# Patient Record
Sex: FEMALE | Race: AMERICAN INDIAN OR ALASKA NATIVE | NOT HISPANIC OR LATINO
[De-identification: names, ages, dates, MRNs, and addresses within clinical notes are randomized per-mention and may not be internally consistent; named-entity substitution may affect disease eponyms.]

---

## 2017-02-21 ENCOUNTER — APPOINTMENT (OUTPATIENT)
Dept: DERMATOLOGY | Facility: CLINIC | Age: 42
End: 2017-02-21

## 2017-02-21 VITALS
HEART RATE: 73 BPM | DIASTOLIC BLOOD PRESSURE: 77 MMHG | SYSTOLIC BLOOD PRESSURE: 111 MMHG | WEIGHT: 155 LBS | HEIGHT: 59 IN | BODY MASS INDEX: 31.25 KG/M2

## 2017-04-13 ENCOUNTER — CLINICAL ADVICE (OUTPATIENT)
Age: 42
End: 2017-04-13

## 2017-04-21 ENCOUNTER — APPOINTMENT (OUTPATIENT)
Dept: INTERNAL MEDICINE | Facility: CLINIC | Age: 42
End: 2017-04-21

## 2017-04-21 VITALS
BODY MASS INDEX: 30.84 KG/M2 | SYSTOLIC BLOOD PRESSURE: 113 MMHG | HEIGHT: 59 IN | WEIGHT: 153 LBS | DIASTOLIC BLOOD PRESSURE: 75 MMHG | HEART RATE: 85 BPM

## 2017-04-21 DIAGNOSIS — E55.9 VITAMIN D DEFICIENCY, UNSPECIFIED: ICD-10-CM

## 2017-04-21 DIAGNOSIS — Z83.3 FAMILY HISTORY OF DIABETES MELLITUS: ICD-10-CM

## 2017-04-21 DIAGNOSIS — Z83.49 FAMILY HISTORY OF OTHER ENDOCRINE, NUTRITIONAL AND METABOLIC DISEASES: ICD-10-CM

## 2017-04-21 DIAGNOSIS — Z82.49 FAMILY HISTORY OF ISCHEMIC HEART DISEASE AND OTHER DISEASES OF THE CIRCULATORY SYSTEM: ICD-10-CM

## 2017-04-21 RX ORDER — TRIAMCINOLONE ACETONIDE 5 MG/G
0.5 OINTMENT TOPICAL
Qty: 1 | Refills: 2 | Status: DISCONTINUED | COMMUNITY
Start: 2017-02-21 | End: 2017-04-21

## 2017-04-24 ENCOUNTER — APPOINTMENT (OUTPATIENT)
Dept: NUTRITION | Facility: CLINIC | Age: 42
End: 2017-04-24

## 2017-05-01 LAB
ESTIMATED AVERGAGE GLUCOSE (NORTH): 111 MG/DL
HBA1C MFR BLD: 5.5 %

## 2017-06-13 ENCOUNTER — OUTPATIENT (OUTPATIENT)
Dept: OUTPATIENT SERVICES | Facility: HOSPITAL | Age: 42
LOS: 1 days | Discharge: HOME | End: 2017-06-13

## 2017-06-16 ENCOUNTER — APPOINTMENT (OUTPATIENT)
Dept: INTERNAL MEDICINE | Facility: CLINIC | Age: 42
End: 2017-06-16

## 2017-06-28 DIAGNOSIS — M50.01 CERVICAL DISC DISORDER WITH MYELOPATHY, HIGH CERVICAL REGION: ICD-10-CM

## 2017-06-28 DIAGNOSIS — G60.9 HEREDITARY AND IDIOPATHIC NEUROPATHY, UNSPECIFIED: ICD-10-CM

## 2017-07-24 ENCOUNTER — APPOINTMENT (OUTPATIENT)
Age: 42
End: 2017-07-24

## 2017-08-25 ENCOUNTER — OUTPATIENT (OUTPATIENT)
Dept: OUTPATIENT SERVICES | Facility: HOSPITAL | Age: 42
LOS: 1 days | Discharge: HOME | End: 2017-08-25

## 2017-09-01 ENCOUNTER — OUTPATIENT (OUTPATIENT)
Dept: OUTPATIENT SERVICES | Facility: HOSPITAL | Age: 42
LOS: 1 days | Discharge: HOME | End: 2017-09-01

## 2018-03-18 ENCOUNTER — OUTPATIENT (OUTPATIENT)
Dept: OUTPATIENT SERVICES | Facility: HOSPITAL | Age: 43
LOS: 1 days | Discharge: HOME | End: 2018-03-18

## 2018-03-18 DIAGNOSIS — M54.9 DORSALGIA, UNSPECIFIED: ICD-10-CM

## 2018-03-18 DIAGNOSIS — M54.2 CERVICALGIA: ICD-10-CM

## 2018-05-23 ENCOUNTER — APPOINTMENT (OUTPATIENT)
Dept: INTERNAL MEDICINE | Facility: CLINIC | Age: 43
End: 2018-05-23

## 2018-05-23 ENCOUNTER — OUTPATIENT (OUTPATIENT)
Dept: OUTPATIENT SERVICES | Facility: HOSPITAL | Age: 43
LOS: 1 days | Discharge: HOME | End: 2018-05-23

## 2018-05-23 VITALS
WEIGHT: 158 LBS | BODY MASS INDEX: 31.85 KG/M2 | HEART RATE: 89 BPM | HEIGHT: 59 IN | DIASTOLIC BLOOD PRESSURE: 78 MMHG | SYSTOLIC BLOOD PRESSURE: 119 MMHG

## 2018-05-23 DIAGNOSIS — E66.9 OBESITY, UNSPECIFIED: ICD-10-CM

## 2018-05-23 DIAGNOSIS — E78.00 PURE HYPERCHOLESTEROLEMIA, UNSPECIFIED: ICD-10-CM

## 2018-05-23 DIAGNOSIS — N94.9 UNSPECIFIED CONDITION ASSOCIATED WITH FEMALE GENITAL ORGANS AND MENSTRUAL CYCLE: ICD-10-CM

## 2018-05-23 DIAGNOSIS — Z87.2 PERSONAL HISTORY OF DISEASES OF THE SKIN AND SUBCUTANEOUS TISSUE: ICD-10-CM

## 2018-05-23 RX ORDER — GABAPENTIN 300 MG/1
300 CAPSULE ORAL
Qty: 60 | Refills: 0 | Status: COMPLETED | COMMUNITY
Start: 2017-04-21 | End: 2018-05-23

## 2018-05-23 NOTE — REVIEW OF SYSTEMS
[Muscle Pain] : muscle pain [Back Pain] : back pain [Headache] : headache [Negative] : Genitourinary [Joint Swelling] : no joint swelling [FreeTextEntry9] : neck pain

## 2018-05-23 NOTE — ASSESSMENT
[FreeTextEntry1] : 43 yo F PMHx obesity, HLD, chronic neck pain and back pain here for f/u.\par \par 1.  chronic neck pain - 2/2 cervical radiculopathy\par - neurology f/u\par - pt rehab \par - gabapentin 100mg TID\par - c/w methocarbamol, meloxicam \par \par 2.  HLD\par - check lipid panel \par \par 3.  incidental adnexal cyst\par - pelvic sono ordered\par \par 4. obesity\par - counseled on diet\par - saw nutritionist last year \par - lipid panel ordered \par \par 5. hcm:\par check cbc, cmp.  mammogram done in 2017 and pap smear done 2016\par \par

## 2018-05-23 NOTE — HISTORY OF PRESENT ILLNESS
[FreeTextEntry1] : follow up  \par \par  [de-identified] : 43 yo F PMHx HLD, obesity, vit D deficiency here for follow-up for right sided headache, neck pain, with BL arm distal paresthesias.  \par \par Saw neurologist from [facility] - was given script for MRI.  \par \par Last clinic visit last year - was given gabapentin 300mg BID but could not tolerate it because it made her very sedated.

## 2018-05-23 NOTE — PHYSICAL EXAM
[No Acute Distress] : no acute distress [Normal Sclera/Conjunctiva] : normal sclera/conjunctiva [Normal Outer Ear/Nose] : the outer ears and nose were normal in appearance [No Respiratory Distress] : no respiratory distress  [Clear to Auscultation] : lungs were clear to auscultation bilaterally [No Accessory Muscle Use] : no accessory muscle use [Normal Rate] : normal rate  [Regular Rhythm] : with a regular rhythm [Normal S1, S2] : normal S1 and S2 [No Edema] : there was no peripheral edema [Soft] : abdomen soft [Non Tender] : non-tender [Non-distended] : non-distended [No CVA Tenderness] : no CVA  tenderness [No Spinal Tenderness] : no spinal tenderness [No Joint Swelling] : no joint swelling [Grossly Normal Strength/Tone] : grossly normal strength/tone [Coordination Grossly Intact] : coordination grossly intact [No Focal Deficits] : no focal deficits [Normal Insight/Judgement] : insight and judgment were intact [de-identified] : pain and tender on palpation in the C/S

## 2018-05-24 DIAGNOSIS — E78.00 PURE HYPERCHOLESTEROLEMIA, UNSPECIFIED: ICD-10-CM

## 2018-05-24 DIAGNOSIS — M54.12 RADICULOPATHY, CERVICAL REGION: ICD-10-CM

## 2018-11-23 ENCOUNTER — APPOINTMENT (OUTPATIENT)
Dept: INTERNAL MEDICINE | Facility: CLINIC | Age: 43
End: 2018-11-23

## 2020-12-08 ENCOUNTER — APPOINTMENT (OUTPATIENT)
Dept: SURGERY | Facility: CLINIC | Age: 45
End: 2020-12-08
Payer: COMMERCIAL

## 2020-12-08 VITALS — WEIGHT: 155 LBS | BODY MASS INDEX: 31.25 KG/M2 | HEIGHT: 59 IN

## 2020-12-08 PROCEDURE — 99072 ADDL SUPL MATRL&STAF TM PHE: CPT

## 2020-12-08 PROCEDURE — 99243 OFF/OP CNSLTJ NEW/EST LOW 30: CPT

## 2020-12-08 NOTE — PHYSICAL EXAM
[Normal Breath Sounds] : Normal breath sounds [No Rash or Lesion] : No rash or lesion [Alert] : alert [Calm] : calm [JVD] : no jugular venous distention  [de-identified] : overweight [de-identified] : normal [de-identified] : mildly protuberant abdomen\par  [de-identified] : . [de-identified] : right inguinal hernia

## 2020-12-08 NOTE — ASSESSMENT
[FreeTextEntry1] : Lee is a pleasant 45-year-old woman with a past medical history significant for well-controlled asthma and 2 full-term pregnancies resulting in 2  sections presenting to the office with several months of worsening pain and swelling in the right groin suspicious for hernia.\par \par Physical examination demonstrates a moderate to large tender bulge in the right groin which is reducible with minimal difficulty consistent with a large symptomatic right inguinal hernia warranting surgical repair. There is no evidence of incarceration or strangulation, and the patient denies any symptoms of obstruction.  Examination of the left groin is unremarkable. Her umbilical examination is also unremarkable. She is moderately overweight with her current BMI of 31.\par \par I explained the pros and cons of surgery, as well as all risks, benefits, indications and alternatives of the procedure and the patient understood and agreed. Lee was scheduled for the repair of her right inguinal hernia with mesh on Friday, January 15, 2021 under LOCAL with IV SEDATION at [facility] with presurgical testing waived.  She was encouraged to avoid heavy lifting and strenuous activity in the interim, of course.

## 2020-12-10 ENCOUNTER — APPOINTMENT (OUTPATIENT)
Dept: UROGYNECOLOGY | Facility: CLINIC | Age: 45
End: 2020-12-10

## 2021-01-12 ENCOUNTER — OUTPATIENT (OUTPATIENT)
Dept: OUTPATIENT SERVICES | Facility: HOSPITAL | Age: 46
LOS: 1 days | Discharge: HOME | End: 2021-01-12

## 2021-01-12 ENCOUNTER — LABORATORY RESULT (OUTPATIENT)
Age: 46
End: 2021-01-12

## 2021-01-12 DIAGNOSIS — Z11.59 ENCOUNTER FOR SCREENING FOR OTHER VIRAL DISEASES: ICD-10-CM

## 2021-01-15 ENCOUNTER — OUTPATIENT (OUTPATIENT)
Dept: OUTPATIENT SERVICES | Facility: HOSPITAL | Age: 46
LOS: 1 days | Discharge: HOME | End: 2021-01-15

## 2021-01-15 ENCOUNTER — APPOINTMENT (OUTPATIENT)
Dept: SURGERY | Facility: AMBULATORY SURGERY CENTER | Age: 46
End: 2021-01-15
Payer: COMMERCIAL

## 2021-01-15 VITALS
HEIGHT: 59 IN | WEIGHT: 154.98 LBS | DIASTOLIC BLOOD PRESSURE: 64 MMHG | TEMPERATURE: 99 F | OXYGEN SATURATION: 100 % | HEART RATE: 82 BPM | SYSTOLIC BLOOD PRESSURE: 117 MMHG | RESPIRATION RATE: 18 BRPM

## 2021-01-15 VITALS
HEART RATE: 80 BPM | RESPIRATION RATE: 20 BRPM | SYSTOLIC BLOOD PRESSURE: 121 MMHG | OXYGEN SATURATION: 100 % | DIASTOLIC BLOOD PRESSURE: 63 MMHG

## 2021-01-15 DIAGNOSIS — Z98.891 HISTORY OF UTERINE SCAR FROM PREVIOUS SURGERY: Chronic | ICD-10-CM

## 2021-01-15 DIAGNOSIS — Z98.890 OTHER SPECIFIED POSTPROCEDURAL STATES: Chronic | ICD-10-CM

## 2021-01-15 PROCEDURE — 49505 PRP I/HERN INIT REDUC >5 YR: CPT | Mod: RT

## 2021-01-15 RX ORDER — SODIUM CHLORIDE 9 MG/ML
1000 INJECTION, SOLUTION INTRAVENOUS
Refills: 0 | Status: DISCONTINUED | OUTPATIENT
Start: 2021-01-15 | End: 2021-01-29

## 2021-01-15 RX ORDER — ALBUTEROL 90 UG/1
0.5 AEROSOL, METERED ORAL
Qty: 0 | Refills: 0 | DISCHARGE

## 2021-01-15 RX ORDER — GABAPENTIN 400 MG/1
1 CAPSULE ORAL
Qty: 0 | Refills: 0 | DISCHARGE

## 2021-01-15 RX ORDER — MORPHINE SULFATE 50 MG/1
2 CAPSULE, EXTENDED RELEASE ORAL
Refills: 0 | Status: DISCONTINUED | OUTPATIENT
Start: 2021-01-15 | End: 2021-01-15

## 2021-01-15 RX ORDER — MELOXICAM 15 MG/1
1 TABLET ORAL
Qty: 0 | Refills: 0 | DISCHARGE

## 2021-01-15 RX ORDER — TRAMADOL HYDROCHLORIDE 50 MG/1
1 TABLET ORAL
Qty: 30 | Refills: 0
Start: 2021-01-15 | End: 2021-01-19

## 2021-01-15 RX ADMIN — SODIUM CHLORIDE 100 MILLILITER(S): 9 INJECTION, SOLUTION INTRAVENOUS at 12:29

## 2021-01-15 NOTE — CHART NOTE - NSCHARTNOTEFT_GEN_A_CORE
PACU ANESTHESIA ADMISSION NOTE      Procedure: Repair of right inguinal hernia with mesh      Post op diagnosis:  Inguinal hernia, right        ____  Intubated  TV:______       Rate: ______      FiO2: ______    __x__  Patent Airway    __x__  Full return of protective reflexes    __x__  Full recovery from anesthesia / back to baseline status    Vitals  HR: 76  BP: 112/65  RR: 15  O2 Sat: 100  Temp: 97.6    Mental Status:  __x__ Awake   _____ Alert   ___x__ Drowsy   _____ Sedated    Nausea/Vomiting:  __x__ NO  ______Yes,   See Post - Op Orders          Pain Scale (0-10):  _____    Treatment: ____ None    __x__ See Post - Op/PCA Orders    Post - Operative Fluids:   ____ Oral   __x__ See Post - Op Orders    Plan: Discharge when criteria met:   __x__Home       _____Floor     _____Critical Care   Other:_________________    Comments: Patient had smooth intraoperative event, no anesthesia complication.

## 2021-01-15 NOTE — ASU PREOP CHECKLIST - ALLERGY BAND ON
no known allergies Valtrex Pregnancy And Lactation Text: this medication is Pregnancy Category B and is considered safe during pregnancy. This medication is not directly found in breast milk but it's metabolite acyclovir is present.

## 2021-01-15 NOTE — ASU DISCHARGE PLAN (ADULT/PEDIATRIC) - CARE PROVIDER_API CALL
Alli Hurtado)  Surgery  [address] [facility], [address] [address]  Phone: [phone]  Fax: [phone]  Scheduled Appointment: 01/26/2021

## 2021-01-22 DIAGNOSIS — J45.909 UNSPECIFIED ASTHMA, UNCOMPLICATED: ICD-10-CM

## 2021-01-22 DIAGNOSIS — K40.90 UNILATERAL INGUINAL HERNIA, WITHOUT OBSTRUCTION OR GANGRENE, NOT SPECIFIED AS RECURRENT: ICD-10-CM

## 2021-01-22 DIAGNOSIS — M06.9 RHEUMATOID ARTHRITIS, UNSPECIFIED: ICD-10-CM

## 2021-01-26 ENCOUNTER — APPOINTMENT (OUTPATIENT)
Dept: SURGERY | Facility: CLINIC | Age: 46
End: 2021-01-26
Payer: COMMERCIAL

## 2021-01-26 DIAGNOSIS — K40.90 UNILATERAL INGUINAL HERNIA, W/OUT OBSTRUCTION OR GANGRENE, NOT SPECIFIED AS RECURRENT: ICD-10-CM

## 2021-01-26 PROCEDURE — 99024 POSTOP FOLLOW-UP VISIT: CPT

## 2021-01-26 NOTE — ASSESSMENT
[FreeTextEntry1] : Lee underwent the repair of her large direct right inguinal hernia with mesh on January 15, 2021 under local with IV sedation without any problems or complications. Her wound is clean, dry and intact. There is no evidence of erythema, seroma formation or infection. She is tolerating a diet and having normal bowel movements. She denies any significant postoperative pain or discomfort at this time.\par \par She was counseled and reassured. Lee was discharged from the office with no specific followup necessary, but she knows to avoid any heavy lifting or strenuous activity for the next several weeks. We also discussed the importance of calorie restriction and healthy eating with regard to weight loss, hernia recurrence and her overall health.

## 2021-01-26 NOTE — CONSULT LETTER
[FreeTextEntry1] : Dear Dr. Yvonne Foster, \par \par I had the pleasure of seeing your patient, TIMMY ZAMORA, in my office today. Please see my note below. \par \par Thank you very much for allowing me to participate in the care of this patient. If you have any questions, please do not hesitate to contact me. \par \par \par Respectfully,\par \par Alli Hurtado M.D., FACS\par  \par \par \par \par cc: Dr. Ishan Ingram

## 2021-04-29 ENCOUNTER — EMERGENCY (EMERGENCY)
Facility: HOSPITAL | Age: 46
LOS: 0 days | Discharge: HOME | End: 2021-04-29
Attending: STUDENT IN AN ORGANIZED HEALTH CARE EDUCATION/TRAINING PROGRAM | Admitting: STUDENT IN AN ORGANIZED HEALTH CARE EDUCATION/TRAINING PROGRAM
Payer: COMMERCIAL

## 2021-04-29 VITALS
DIASTOLIC BLOOD PRESSURE: 60 MMHG | OXYGEN SATURATION: 99 % | RESPIRATION RATE: 16 BRPM | SYSTOLIC BLOOD PRESSURE: 113 MMHG | HEART RATE: 90 BPM

## 2021-04-29 VITALS
DIASTOLIC BLOOD PRESSURE: 77 MMHG | OXYGEN SATURATION: 98 % | HEART RATE: 89 BPM | WEIGHT: 158.07 LBS | RESPIRATION RATE: 20 BRPM | TEMPERATURE: 98 F | SYSTOLIC BLOOD PRESSURE: 146 MMHG | HEIGHT: 59 IN

## 2021-04-29 DIAGNOSIS — M25.511 PAIN IN RIGHT SHOULDER: ICD-10-CM

## 2021-04-29 DIAGNOSIS — M06.9 RHEUMATOID ARTHRITIS, UNSPECIFIED: ICD-10-CM

## 2021-04-29 DIAGNOSIS — Z98.890 OTHER SPECIFIED POSTPROCEDURAL STATES: Chronic | ICD-10-CM

## 2021-04-29 DIAGNOSIS — R07.9 CHEST PAIN, UNSPECIFIED: ICD-10-CM

## 2021-04-29 DIAGNOSIS — M79.621 PAIN IN RIGHT UPPER ARM: ICD-10-CM

## 2021-04-29 DIAGNOSIS — Z98.891 HISTORY OF UTERINE SCAR FROM PREVIOUS SURGERY: Chronic | ICD-10-CM

## 2021-04-29 DIAGNOSIS — R07.89 OTHER CHEST PAIN: ICD-10-CM

## 2021-04-29 DIAGNOSIS — J45.909 UNSPECIFIED ASTHMA, UNCOMPLICATED: ICD-10-CM

## 2021-04-29 LAB
ALBUMIN SERPL ELPH-MCNC: 4.6 G/DL — SIGNIFICANT CHANGE UP (ref 3.5–5.2)
ALP SERPL-CCNC: 95 U/L — SIGNIFICANT CHANGE UP (ref 30–115)
ALT FLD-CCNC: 13 U/L — SIGNIFICANT CHANGE UP (ref 0–41)
ANION GAP SERPL CALC-SCNC: 15 MMOL/L — HIGH (ref 7–14)
APTT BLD: 44.9 SEC — HIGH (ref 27–39.2)
AST SERPL-CCNC: 15 U/L — SIGNIFICANT CHANGE UP (ref 0–41)
BASOPHILS # BLD AUTO: 0.06 K/UL — SIGNIFICANT CHANGE UP (ref 0–0.2)
BASOPHILS NFR BLD AUTO: 0.6 % — SIGNIFICANT CHANGE UP (ref 0–1)
BILIRUB SERPL-MCNC: <0.2 MG/DL — SIGNIFICANT CHANGE UP (ref 0.2–1.2)
BUN SERPL-MCNC: 9 MG/DL — LOW (ref 10–20)
CALCIUM SERPL-MCNC: 9.8 MG/DL — SIGNIFICANT CHANGE UP (ref 8.5–10.1)
CHLORIDE SERPL-SCNC: 102 MMOL/L — SIGNIFICANT CHANGE UP (ref 98–110)
CO2 SERPL-SCNC: 20 MMOL/L — SIGNIFICANT CHANGE UP (ref 17–32)
CREAT SERPL-MCNC: 0.7 MG/DL — SIGNIFICANT CHANGE UP (ref 0.7–1.5)
EOSINOPHIL # BLD AUTO: 0.61 K/UL — SIGNIFICANT CHANGE UP (ref 0–0.7)
EOSINOPHIL NFR BLD AUTO: 6 % — SIGNIFICANT CHANGE UP (ref 0–8)
GLUCOSE SERPL-MCNC: 107 MG/DL — HIGH (ref 70–99)
HCG SERPL QL: NEGATIVE — SIGNIFICANT CHANGE UP
HCT VFR BLD CALC: 38.1 % — SIGNIFICANT CHANGE UP (ref 37–47)
HGB BLD-MCNC: 12.4 G/DL — SIGNIFICANT CHANGE UP (ref 12–16)
IMM GRANULOCYTES NFR BLD AUTO: 0.4 % — HIGH (ref 0.1–0.3)
INR BLD: 1.03 RATIO — SIGNIFICANT CHANGE UP (ref 0.65–1.3)
LYMPHOCYTES # BLD AUTO: 3.84 K/UL — HIGH (ref 1.2–3.4)
LYMPHOCYTES # BLD AUTO: 37.5 % — SIGNIFICANT CHANGE UP (ref 20.5–51.1)
MCHC RBC-ENTMCNC: 26.7 PG — LOW (ref 27–31)
MCHC RBC-ENTMCNC: 32.5 G/DL — SIGNIFICANT CHANGE UP (ref 32–37)
MCV RBC AUTO: 82.1 FL — SIGNIFICANT CHANGE UP (ref 81–99)
MONOCYTES # BLD AUTO: 0.68 K/UL — HIGH (ref 0.1–0.6)
MONOCYTES NFR BLD AUTO: 6.6 % — SIGNIFICANT CHANGE UP (ref 1.7–9.3)
NEUTROPHILS # BLD AUTO: 5 K/UL — SIGNIFICANT CHANGE UP (ref 1.4–6.5)
NEUTROPHILS NFR BLD AUTO: 48.9 % — SIGNIFICANT CHANGE UP (ref 42.2–75.2)
NRBC # BLD: 0 /100 WBCS — SIGNIFICANT CHANGE UP (ref 0–0)
PLATELET # BLD AUTO: 344 K/UL — SIGNIFICANT CHANGE UP (ref 130–400)
POTASSIUM SERPL-MCNC: 4.2 MMOL/L — SIGNIFICANT CHANGE UP (ref 3.5–5)
POTASSIUM SERPL-SCNC: 4.2 MMOL/L — SIGNIFICANT CHANGE UP (ref 3.5–5)
PROT SERPL-MCNC: 7.7 G/DL — SIGNIFICANT CHANGE UP (ref 6–8)
PROTHROM AB SERPL-ACNC: 11.8 SEC — SIGNIFICANT CHANGE UP (ref 9.95–12.87)
RBC # BLD: 4.64 M/UL — SIGNIFICANT CHANGE UP (ref 4.2–5.4)
RBC # FLD: 12.8 % — SIGNIFICANT CHANGE UP (ref 11.5–14.5)
SODIUM SERPL-SCNC: 137 MMOL/L — SIGNIFICANT CHANGE UP (ref 135–146)
TROPONIN T SERPL-MCNC: <0.01 NG/ML — SIGNIFICANT CHANGE UP
WBC # BLD: 10.23 K/UL — SIGNIFICANT CHANGE UP (ref 4.8–10.8)
WBC # FLD AUTO: 10.23 K/UL — SIGNIFICANT CHANGE UP (ref 4.8–10.8)

## 2021-04-29 PROCEDURE — 71275 CT ANGIOGRAPHY CHEST: CPT | Mod: 26,MA

## 2021-04-29 PROCEDURE — 73030 X-RAY EXAM OF SHOULDER: CPT | Mod: 26,RT

## 2021-04-29 PROCEDURE — 70498 CT ANGIOGRAPHY NECK: CPT | Mod: 26,MA

## 2021-04-29 PROCEDURE — 71045 X-RAY EXAM CHEST 1 VIEW: CPT | Mod: 26

## 2021-04-29 PROCEDURE — 93010 ELECTROCARDIOGRAM REPORT: CPT

## 2021-04-29 PROCEDURE — 73110 X-RAY EXAM OF WRIST: CPT | Mod: 26,RT

## 2021-04-29 PROCEDURE — 73080 X-RAY EXAM OF ELBOW: CPT | Mod: 26,RT

## 2021-04-29 PROCEDURE — 70496 CT ANGIOGRAPHY HEAD: CPT | Mod: 26,MA

## 2021-04-29 PROCEDURE — 99285 EMERGENCY DEPT VISIT HI MDM: CPT

## 2021-04-29 RX ORDER — METHOCARBAMOL 500 MG/1
1000 TABLET, FILM COATED ORAL ONCE
Refills: 0 | Status: COMPLETED | OUTPATIENT
Start: 2021-04-29 | End: 2021-04-29

## 2021-04-29 RX ORDER — SODIUM CHLORIDE 9 MG/ML
1000 INJECTION, SOLUTION INTRAVENOUS ONCE
Refills: 0 | Status: COMPLETED | OUTPATIENT
Start: 2021-04-29 | End: 2021-04-29

## 2021-04-29 RX ADMIN — SODIUM CHLORIDE 1000 MILLILITER(S): 9 INJECTION, SOLUTION INTRAVENOUS at 16:27

## 2021-04-29 RX ADMIN — METHOCARBAMOL 1000 MILLIGRAM(S): 500 TABLET, FILM COATED ORAL at 19:37

## 2021-04-29 NOTE — ED PROVIDER NOTE - NS ED ROS FT
CONSTITUTIONAL: (-) fevers, (-) chills  EYES: (-) vision changes, (-) photophobia  ENT: (-) congestion, (-) rhinorrhea, (-) sore throat  NECK: (-) neck pain, (-) neck stiffness  CARDIO: (+) chest pain, (-) palpitations, (-) edema  PULM: (-) cough, (-) sputum, (-) chest tightness, (+) shortness of breath, (-) wheezing, (-) hemoptysis, (-) stridor  GI: (-) nausea, (-) vomiting, (-) diarrhea, (-) constipation, (-) abdominal pain, (-) melena, (-) hematochezia  : (-) dysuria, (-) hematuria, (-) frequency, (-) urgency, (-) flank pain  ENDO: (-) polyuria, (-) polydipsia, (-) polyphagia  HEME: (-) easy bruising, (-) easy bleeding  MSK: (-) back pain, (-) myalgias, (-) gait difficulty  SKIN: (-) rashes, (-) pallor  NEURO: see HPI, (+) headache, (-) dizziness, (-) lightheadedness, (-) weakness, (-) syncope    *all other systems negative except as documented above and in the HPI*

## 2021-04-29 NOTE — ED ADULT NURSE NOTE - OBJECTIVE STATEMENT
pt 44 y/o female presents to ED c/o right sided chest pain, up arm into shoulder . pt states she does sometimes have neck pain but not like this

## 2021-04-29 NOTE — ED PROVIDER NOTE - ATTENDING CONTRIBUTION TO CARE
45F with PMH asthma not on CCS never intubated, RA not on meds, who p/w pain to her R arm that radiates up her neck and associated with pain to her R temple. She reports b/l hand pain at baseline since she is on a computer for prolonged period of time for work. Pain described as "pulling", radiates to her scapula, not pulsatile or throbbing and denies focal weakness or numbness, trauma/falls, SOB, n/v/f/c, visual changes, phono/photophobia. She was previously on meloxicam for RA but has been off for years; her rheumatologist has since retired.     Gen - NAD, Head - NCAT, Pharynx - clear, MMM, Heart - RRR, no m/g/r, Lungs - CTAB, no w/c/r, Abdomen - soft, NT, ND, Skin - No rash, Extremities - + ttp R AC joint, R elbow, FROM, no edema, erythema, ecchymosis, brisk cap refill, Neuro - CN 2-12 intact, nl strength and sensation, nl gait.    a/p: 45F with PMH asthma not on CCS never intubated, RA not on meds, who p/w pain to her R arm that radiates up her neck and associated with pain to her R temple. She reports b/l hand pain at baseline since she is on a computer for prolonged period of time for work. Pain described as "pulling", radiates to her scapula, not pulsatile or throbbing and denies focal weakness or numbness, trauma/falls, SOB, n/v/f/c, visual changes, phono/photophobia, jaw claudication. She was previously on meloxicam for RA but has been off for years; her rheumatologist has since retired.     Gen - NAD, Head - NCAT, Pharynx - clear, MMM, Heart - RRR, no m/g/r, Lungs - CTAB, no w/c/r, Abdomen - soft, NT, ND, Skin - No rash, Extremities - + ttp R AC joint, R elbow, FROM, no edema, erythema, ecchymosis, brisk cap refill, Neuro - CN 2-12 intact, nl strength and sensation, nl gait.    a/p: suspect MSK related however will need to r/o dissection, and will obtain ekg, cxr, ct angio head/neck/chest, labs, and will reassess. 45F with PMH asthma not on CCS never intubated, RA not on meds, who p/w pain to her R arm that radiates up her neck and associated with pain to her R temple. She reports b/l hand pain at baseline since she is on a computer for prolonged period of time for work. Pain described as "pulling", radiates to her scapula, not pulsatile or throbbing and denies focal weakness or numbness, trauma/falls, SOB, n/v/f/c, visual changes, phono/photophobia, jaw claudication. She was previously on meloxicam for RA but has been off for years; her rheumatologist has since retired.     Gen - NAD, Head - NCAT, no ttp b/l temples, Pharynx - clear, MMM, Heart - RRR, no m/g/r, Lungs - CTAB, no w/c/r, Abdomen - soft, NT, ND, Skin - No rash, Extremities - + ttp R AC joint, R elbow, FROM, no edema, erythema, ecchymosis, brisk cap refill, Neuro - CN 2-12 intact, nl strength and sensation, nl gait.    a/p: suspect MSK related however will need to r/o dissection, and will obtain ekg, cxr, ct angio head/neck/chest, labs, and will reassess.

## 2021-04-29 NOTE — ED PROVIDER NOTE - PHYSICAL EXAMINATION
VITALS:  I have reviewed the initial vital signs.  GENERAL: Well-developed, well-nourished, in no acute distress. Nontoxic.  HEENT: Sclera clear. EOMI, PERRLA. Mucous membranes moist.  NECK: supple w FROM. No JVD. No paraspinal or midline ttp.   CARDIO: RRR, nl S1 and S2. No murmurs, rubs, or gallops. No peripheral edema. 2+ radial and pedal pulses bilaterally. No chest wall tenderness.  PULM: Normal effort. CTA b/l without wheezes, rales, or rhonchi.  MSK: Patient holding right arm against body, unable to move shoulder 2/2 pain. intact ROM to elbow and wrist.   GI: Abdomen soft and non-distended. Nontender.  SKIN: Warm, dry. No pallor. No rash.  NEURO: A&Ox3. Speech clear. CN II-XII intact. 5/5 strength and sensation to b/l upper and lower extremities.   PSYCH: Calm and cooperative.

## 2021-04-29 NOTE — ED ADULT NURSE NOTE - NSIMPLEMENTINTERV_GEN_ALL_ED
Implemented All Universal Safety Interventions:  [place] to call system. Call bell, personal items and telephone within reach. Instruct patient to call for assistance. Room bathroom lighting operational. Non-slip footwear when patient is off stretcher. Physically safe environment: no spills, clutter or unnecessary equipment. Stretcher in lowest position, wheels locked, appropriate side rails in place.

## 2021-04-29 NOTE — ED PROVIDER NOTE - CARE PROVIDER_API CALL
Kayleen Mejia)  Rheumatology  [address] [place] [address]  Phone: [phone]  Fax: [phone]  Follow Up Time:

## 2021-04-29 NOTE — ED PROVIDER NOTE - OBJECTIVE STATEMENT
45 year old female w hx of cervical radiculopathy presents to the ED for evaluation of right arm and chest pain x 2 hours. Pt states she was at home working on her computer when her pain began. She initially attributed it to her radiculopathy, tried Bengay which did not improve the pain. She then began to develop worsening pain, which radiates now to her head and neck, as well as to her back, along with numbness to her hand. Pain is not pleuritic, positional, or worse with exertion. Denies fevers/chills, URI sx, cough, wheezing, palpitations, syncope, diaphoresis, n/v, abd pain, leg pain/swelling, recent travel/surgeries/immobilizations/trauma. +fhx of CAD (mother  of MI in her 40s).

## 2021-04-29 NOTE — ED PROVIDER NOTE - CLINICAL SUMMARY MEDICAL DECISION MAKING FREE TEXT BOX
45F with PMH asthma not on CCS never intubated, RA not on meds, who p/w pain to her R arm that radiates up her neck and associated with pain to her R temple. She reports b/l hand pain at baseline since she is on a computer for prolonged period of time for work. Pain described as "pulling", radiates to her scapula, not pulsatile or throbbing and denies focal weakness or numbness, trauma/falls, SOB, n/v/f/c, visual changes, phono/photophobia, jaw claudication. ekg non-ischemic, cxr clear, ct angio head/neck/chest reviewed and wnl. HEART score 1. neuro exam nl. no evidence of trauma. Pt reports improvement of sx and amenable with d/c and f/u with rheumatology. I have fully discussed the medical management and delivery of care with the patient. I have discussed any available labs, imaging and treatment options with the patient. All Questions answered at the bedside and printed copies of all results provided and recommended to review with PCP. Patient confirms understanding and has been given detailed return precautions. Patient instructed to return to the ED should symptoms persist or worsen. Patient has demonstrated capacity and has verbalized understanding. Patient is well appearing upon discharge, ambulatory with a steady gait.

## 2021-05-14 ENCOUNTER — APPOINTMENT (OUTPATIENT)
Dept: RHEUMATOLOGY | Facility: CLINIC | Age: 46
End: 2021-05-14
Payer: COMMERCIAL

## 2021-05-14 ENCOUNTER — RESULT REVIEW (OUTPATIENT)
Age: 46
End: 2021-05-14

## 2021-05-14 ENCOUNTER — OUTPATIENT (OUTPATIENT)
Dept: OUTPATIENT SERVICES | Facility: HOSPITAL | Age: 46
LOS: 1 days | Discharge: HOME | End: 2021-05-14

## 2021-05-14 ENCOUNTER — OUTPATIENT (OUTPATIENT)
Dept: OUTPATIENT SERVICES | Facility: HOSPITAL | Age: 46
LOS: 1 days | Discharge: HOME | End: 2021-05-14
Payer: COMMERCIAL

## 2021-05-14 VITALS — WEIGHT: 152 LBS | HEIGHT: 59 IN | BODY MASS INDEX: 30.64 KG/M2 | TEMPERATURE: 97.2 F

## 2021-05-14 DIAGNOSIS — M79.641 PAIN IN RIGHT HAND: ICD-10-CM

## 2021-05-14 DIAGNOSIS — Z98.891 HISTORY OF UTERINE SCAR FROM PREVIOUS SURGERY: Chronic | ICD-10-CM

## 2021-05-14 DIAGNOSIS — Z98.890 OTHER SPECIFIED POSTPROCEDURAL STATES: Chronic | ICD-10-CM

## 2021-05-14 PROCEDURE — 73130 X-RAY EXAM OF HAND: CPT | Mod: 26,50

## 2021-05-14 PROCEDURE — 99205 OFFICE O/P NEW HI 60 MIN: CPT | Mod: GC

## 2021-05-14 PROCEDURE — 73030 X-RAY EXAM OF SHOULDER: CPT | Mod: 26,50

## 2021-05-14 RX ORDER — GABAPENTIN 100 MG/1
100 CAPSULE ORAL 3 TIMES DAILY
Qty: 90 | Refills: 1 | Status: DISCONTINUED | COMMUNITY
Start: 2018-05-23 | End: 2021-05-14

## 2021-05-14 RX ORDER — MELOXICAM 15 MG/1
15 TABLET ORAL
Qty: 30 | Refills: 3 | Status: DISCONTINUED | COMMUNITY
Start: 2018-05-23 | End: 2021-05-14

## 2021-05-14 NOTE — PHYSICAL EXAM
[No Acute Distress] : no acute distress [Well Nourished] : well nourished [No Respiratory Distress] : no respiratory distress  [No Accessory Muscle Use] : no accessory muscle use [Clear to Auscultation] : lungs were clear to auscultation bilaterally [Normal Rate] : normal rate  [Regular Rhythm] : with a regular rhythm [Normal S1, S2] : normal S1 and S2 [Soft] : abdomen soft [Non Tender] : non-tender [Non-distended] : non-distended [No Joint Swelling] : no joint swelling [No Focal Deficits] : no focal deficits [Normal Affect] : the affect was normal [Normal Insight/Judgement] : insight and judgment were intact

## 2021-05-14 NOTE — ASSESSMENT
[FreeTextEntry1] : Right Arm Pain with Radiation to Hand\par -R/O RA vs cervical Radiculopathy \par -Will send for MR cervical spine, B/L shoulder and hand Xrays \par -RF.CCP, Vitamin D and B12 levels \par -In-House Neurology Referral given \par \par RTC in 3months  Yes

## 2021-05-14 NOTE — ADDENDUM
[FreeTextEntry1] : Right arm pain\par Shoulder pain bilateral \par Radiculopathy Lumbar\par Neck pain\par \par Has had prior MRI showing lumbar radiculopathy. Will get cervical MRI. labs as above. Hands and shoulder Xrays .  f/u 3 mo

## 2021-05-14 NOTE — HISTORY OF PRESENT ILLNESS
[FreeTextEntry1] : New Pt care Establishment  [de-identified] : Patient is 46 y/o female with medical Hx of Chronic Neck pain with radiation to R Hand. She denied any Trauma, she had initial complaint of Neck pain with B/L arm paraesthesia in 2018, she was diagnosed with possible cervical radiculopathy, she tried gabapentin and meloxicam which helped her symptoms significantly. But in the last 3 weeks she started complaining of Right shoulder pain, 9/10, sharp, constant, radiating down to the right arm  and has become more constant for the past 3 weeks . she was given neurology f/up but they only have appointment available for September 2021. She has positive family hx of RA in both Mother and Grandmother

## 2021-07-21 ENCOUNTER — OUTPATIENT (OUTPATIENT)
Dept: OUTPATIENT SERVICES | Facility: HOSPITAL | Age: 46
LOS: 1 days | Discharge: HOME | End: 2021-07-21
Payer: COMMERCIAL

## 2021-07-21 ENCOUNTER — RESULT REVIEW (OUTPATIENT)
Age: 46
End: 2021-07-21

## 2021-07-21 DIAGNOSIS — Z98.890 OTHER SPECIFIED POSTPROCEDURAL STATES: Chronic | ICD-10-CM

## 2021-07-21 DIAGNOSIS — M79.641 PAIN IN RIGHT HAND: ICD-10-CM

## 2021-07-21 DIAGNOSIS — Z98.891 HISTORY OF UTERINE SCAR FROM PREVIOUS SURGERY: Chronic | ICD-10-CM

## 2021-07-21 PROCEDURE — 72141 MRI NECK SPINE W/O DYE: CPT | Mod: 26

## 2021-07-21 PROCEDURE — 70551 MRI BRAIN STEM W/O DYE: CPT | Mod: 26

## 2021-08-16 DIAGNOSIS — M79.641 PAIN IN RIGHT HAND: ICD-10-CM

## 2021-08-16 DIAGNOSIS — M54.2 CERVICALGIA: ICD-10-CM

## 2021-08-27 ENCOUNTER — APPOINTMENT (OUTPATIENT)
Dept: RHEUMATOLOGY | Facility: CLINIC | Age: 46
End: 2021-08-27

## 2021-09-03 ENCOUNTER — OUTPATIENT (OUTPATIENT)
Dept: OUTPATIENT SERVICES | Facility: HOSPITAL | Age: 46
LOS: 1 days | Discharge: HOME | End: 2021-09-03

## 2021-09-03 ENCOUNTER — APPOINTMENT (OUTPATIENT)
Dept: RHEUMATOLOGY | Facility: CLINIC | Age: 46
End: 2021-09-03
Payer: COMMERCIAL

## 2021-09-03 ENCOUNTER — NON-APPOINTMENT (OUTPATIENT)
Age: 46
End: 2021-09-03

## 2021-09-03 VITALS
TEMPERATURE: 97 F | DIASTOLIC BLOOD PRESSURE: 75 MMHG | WEIGHT: 154 LBS | HEART RATE: 79 BPM | BODY MASS INDEX: 31.04 KG/M2 | OXYGEN SATURATION: 98 % | HEIGHT: 59 IN | SYSTOLIC BLOOD PRESSURE: 110 MMHG

## 2021-09-03 DIAGNOSIS — Z98.890 OTHER SPECIFIED POSTPROCEDURAL STATES: Chronic | ICD-10-CM

## 2021-09-03 DIAGNOSIS — Z98.891 HISTORY OF UTERINE SCAR FROM PREVIOUS SURGERY: Chronic | ICD-10-CM

## 2021-09-03 PROCEDURE — 99214 OFFICE O/P EST MOD 30 MIN: CPT | Mod: GC

## 2021-10-06 ENCOUNTER — OUTPATIENT (OUTPATIENT)
Dept: OUTPATIENT SERVICES | Facility: HOSPITAL | Age: 46
LOS: 1 days | Discharge: HOME | End: 2021-10-06
Payer: COMMERCIAL

## 2021-10-06 DIAGNOSIS — R32 UNSPECIFIED URINARY INCONTINENCE: ICD-10-CM

## 2021-10-06 DIAGNOSIS — Z98.890 OTHER SPECIFIED POSTPROCEDURAL STATES: Chronic | ICD-10-CM

## 2021-10-06 DIAGNOSIS — Z98.891 HISTORY OF UTERINE SCAR FROM PREVIOUS SURGERY: Chronic | ICD-10-CM

## 2021-10-06 PROCEDURE — 72148 MRI LUMBAR SPINE W/O DYE: CPT | Mod: 26

## 2021-10-21 ENCOUNTER — APPOINTMENT (OUTPATIENT)
Dept: SURGERY | Facility: CLINIC | Age: 46
End: 2021-10-21
Payer: COMMERCIAL

## 2021-10-21 VITALS — WEIGHT: 155 LBS | BODY MASS INDEX: 31.25 KG/M2 | HEIGHT: 59 IN

## 2021-10-21 DIAGNOSIS — S76.219A STRAIN OF ADDUCTOR MUSCLE, FASCIA AND TENDON OF UNSPECIFIED THIGH, INITIAL ENCOUNTER: ICD-10-CM

## 2021-10-21 DIAGNOSIS — R10.31 RIGHT LOWER QUADRANT PAIN: ICD-10-CM

## 2021-10-21 PROCEDURE — 99214 OFFICE O/P EST MOD 30 MIN: CPT

## 2021-10-21 NOTE — PHYSICAL EXAM
[Normal Breath Sounds] : Normal breath sounds [No Rash or Lesion] : No rash or lesion [Alert] : alert [Calm] : calm [JVD] : no jugular venous distention  [de-identified] : overweight [de-identified] : normal [de-identified] : moderately protuberant abdomen\par  [de-identified] : no hernia recurrence

## 2021-10-21 NOTE — CONSULT LETTER
[FreeTextEntry1] : Dear Dr. Yvonne Foster, \par \par I had the pleasure of seeing your patient, TIMMY ZAMORA, in my office today. Please see my note below. \par \par Thank you very much for allowing me to participate in the care of this patient. If you have any questions, please do not hesitate to contact me. \par \par \par Respectfully,\par \par Alli Hurtado M.D., FACS\par  \par \par \par \par cc: Dr. Ishan Ingram. \par

## 2021-10-21 NOTE — ED PROVIDER NOTE - WR ORDER DATE AND TIME 2
Advance Care Planning     Advance Care Planning (ACP) Physician/NP/PA Conversation      Date of Conversation: 10/21/2021  Conducted with: Patient with Decision Making Capacity    Healthcare Decision Maker:     Primary Decision Maker: Patricia Lainez - Daughter - [phone]    Secondary Decision Maker: Soy Porras - Sister - [phone]  Click here to complete [place] including selection of the Healthcare Decision Maker Relationship (ie \"Primary\")        Today we documented Decision Maker(s) consistent with Legal Next of Kin hierarchy. Care Preferences:    Hospitalization: \"If your health worsens and it becomes clear that your chance of recovery is unlikely, what would be your preference regarding hospitalization? \"  The patient would prefer hospitalization. Ventilation: \"If you were unable to breathe on your own and your chance of recovery was unlikely, what would be your preference about the use of a ventilator (breathing machine) if it was available to you? \"   The patient would desire the use of a ventilator. Resuscitation: \"In the event your heart stopped as a result of an underlying serious health condition, would you want attempts to be made to restart your heart, or would you prefer a natural death? \"   Yes, attempt to resuscitate.     Additional topics discussed: treatment goals, ventilation preferences and resuscitation preferences    Conversation Outcomes / Follow-Up Plan:   ACP complete - no further action today  Pt will bring copies of forms  Reviewed DNR/DNI and patient elects Full Code (Attempt Resuscitation)     Length of Voluntary ACP Conversation in minutes:  <16 minutes (Non-Billable)    Femi Hernandez MD 29-Apr-2021 16:38

## 2021-10-21 NOTE — ASSESSMENT
[FreeTextEntry1] : Lee presents back to the office approximately 9 months after the repair of her large direct right inguinal hernia with mesh with intermittent pain and discomfort in the right groin which occasionally last anywhere from 30 seconds to 15 minutes. She also has been experiencing lower back pain radiating down to her right ankle. She had a recent lumbosacral MRI which demonstrated bulging discs at T12-L1, L1-L2, L4-L5 and L5-S1.\par \par Physical examination demonstrates a well-healed scar in the right groin with some mild tenderness to deep palpation (which is common) and no evidence of hernia recurrence or delayed wound complications. Examination of her left groin demonstrates a mild to moderate weakness but no obvious hernia. Umbilical examination is unremarkable. She remains significantly overweight with a current BMI of 31.\par \par She was counseled and reassured. Her symptoms may be related to chronic strain in the groin due to overexertion and her excess abdominal weight, but they also may be related to her upper lumbar disc disease as T12, L1 and L2 supply sensory innervation to the right groin. I recommended using alternating heat and ice therapy on her groin and heat therapy on her back and recommended she followup with neurology and seek physical therapy. She may also benefit from a back brace or abdominal binder. She was encouraged to return to me in the future if her symptoms persist or worsen, of course.

## 2021-11-09 ENCOUNTER — APPOINTMENT (OUTPATIENT)
Dept: NEUROLOGY | Facility: CLINIC | Age: 46
End: 2021-11-09

## 2022-07-17 NOTE — REVIEW OF SYSTEMS
Labs s/f hMPV positive on RVP panel, UTI on UA. Patient also desating to 86-87% on RA upon ambulation. Given CAP abx and Tylenol. Patient also now states that he did not fall, but rather slid down his bed, so CT head was cancelled. Plan to admit to medicine for further management. Ricardo Grace [Joint Pain] : joint pain [Fever] : no fever [Chills] : no chills [Fatigue] : no fatigue [Chest Pain] : no chest pain [Palpitations] : no palpitations [Lower Ext Edema] : no lower extremity edema [Shortness Of Breath] : no shortness of breath [Wheezing] : no wheezing [Cough] : no cough [Abdominal Pain] : no abdominal pain [Nausea] : no nausea [Vomiting] : no vomiting [Joint Stiffness] : no joint stiffness [Muscle Weakness] : no muscle weakness [Back Pain] : no back pain [Itching] : no itching [Skin Rash] : no skin rash [Headache] : no headache [Dizziness] : no dizziness [Fainting] : no fainting

## 2023-03-24 ENCOUNTER — APPOINTMENT (OUTPATIENT)
Dept: PAIN MANAGEMENT | Facility: CLINIC | Age: 48
End: 2023-03-24
Payer: OTHER MISCELLANEOUS

## 2023-03-24 ENCOUNTER — TRANSCRIPTION ENCOUNTER (OUTPATIENT)
Age: 48
End: 2023-03-24

## 2023-03-24 VITALS
BODY MASS INDEX: 31.65 KG/M2 | DIASTOLIC BLOOD PRESSURE: 86 MMHG | SYSTOLIC BLOOD PRESSURE: 130 MMHG | HEIGHT: 59 IN | WEIGHT: 157 LBS | HEART RATE: 76 BPM

## 2023-03-24 PROCEDURE — 99204 OFFICE O/P NEW MOD 45 MIN: CPT

## 2023-03-24 NOTE — ASSESSMENT
[FreeTextEntry1] : This is a 47 year old female with a chief complaint of bilateral neck pain with pain around right ear. She is also experiencing headaches. She has been attending PT sessions for 2 weeks with no relief. We will obtain a MRI of the cervical spine and EMG/NCV of the upper extremities for further review. In regards to her headaches, we will obtain a MRI of the brain and she will follow up with neurology. In the interim, she is advised to continue with PT sessions. She will follow up in 3 weeks to review imaging and for further evaluation. All this patients questions were answered and the conversation was understood well.\par \par I recommend seeing Neurology for further evaluation.\par \par Physical therapy of the cervical spine 2-3 times a week for 4-8 weeks stressing a home exercise program of walking, shoulder girdle strengthening,  swimming, elliptical , recumbent bike, Stepan chi and Yoga. Use things that heat like hot shower or icy heat before rehab, exercising and at the beginning of the day, and ice (ice in a bag never directly on the skin) after activity and at the end of the day.\par \par An Upper Extremity EMG/NCV was ordered to delineate radiculopathy vs neuropathies vs nerve damage.\par \par I, Yamila Don, attest that this documentation has been prepared under the direction and in the presence of Provider Vidhi Walker MD.\par \par \par Thank you for allowing me to assist in the management of this patient. \par \par \par Best Regards, \par \par \par Vidhi Walker M.D., [facility]\par \par \par Diplomate, American Board of Physical Medicine and Rehabilitation\par Diplomate, American Board of Pain Medicine \par Diplomate, American Board of Pain Management\par

## 2023-03-24 NOTE — PHYSICAL EXAM
[Normal Coordination] : normal coordination [Normal DTR UE/LE] : normal DTR UE/LE  [Normal Sensation] : normal sensation [Normal Mood and Affect] : normal mood and affect [Orientated] : orientated [Able to Communicate] : able to communicate [Well Developed] : well developed [Well Nourished] : well nourished [] : full ROM with mild stiffness

## 2023-03-24 NOTE — HISTORY OF PRESENT ILLNESS
[FreeTextEntry1] : This is a 47 year old female, presenting as a walk in, here to establish care for bilateral neck pain with pain around the ear. She is also experiencing headaches which are quite bothersome. This pain has been ongoing for several years. She experiences pain in the right ear. The neck pain radiates into the bilateral upper extremities, R>L.  There is numbness/tingling in the right arm. She has been attending PT sessions for the past 2 weeks which provides her no relief of her symptoms.  The pain is constant and makes it difficult for her to sleep. At this time, her case is not related to WC however she is attempting to make it a case.\par

## 2023-03-24 NOTE — DATA REVIEWED
[FreeTextEntry1] : CT of the brain dated 03/17/2023 finds evidence of intracranial hemorrhage.  6 mm  calcification is seen along the posterior left parietal parasagittal region, which may simply relate to a falx calcifaction, but small meningioma is also diagnostic considereation. Recommend MRI scan of the without and with contrast for further evaluation. \par \par MRI of the cervical spine dated 7/21/21 finds Mild multilevel degenerative changes and disc bulges not significantly changed since prior examination without significant spinal canal or neural foraminal narrowing.\par \par SOAPP: no data obtained today.\par \par UDS: No data obtained today.\par \par [place] REGISTRY: Checked.

## 2023-04-05 ENCOUNTER — APPOINTMENT (OUTPATIENT)
Dept: NEUROLOGY | Facility: CLINIC | Age: 48
End: 2023-04-05
Payer: OTHER MISCELLANEOUS

## 2023-04-05 VITALS
HEIGHT: 59 IN | BODY MASS INDEX: 31.65 KG/M2 | HEART RATE: 87 BPM | WEIGHT: 157 LBS | SYSTOLIC BLOOD PRESSURE: 152 MMHG | DIASTOLIC BLOOD PRESSURE: 87 MMHG

## 2023-04-05 DIAGNOSIS — T88.7XXA UNSPECIFIED ADVERSE EFFECT OF DRUG OR MEDICAMENT, INITIAL ENCOUNTER: ICD-10-CM

## 2023-04-05 PROCEDURE — 99204 OFFICE O/P NEW MOD 45 MIN: CPT

## 2023-04-05 RX ORDER — SUMATRIPTAN 100 MG/1
100 TABLET, FILM COATED ORAL
Qty: 9 | Refills: 5 | Status: ACTIVE | COMMUNITY
Start: 2023-04-05 | End: 1900-01-01

## 2023-04-05 NOTE — PHYSICAL EXAM

## 2023-04-05 NOTE — HISTORY OF PRESENT ILLNESS
[FreeTextEntry1] : It's a pleasure to see Ms. TIMMY ZAMORA In the office today. She is a 47 year -  old woman  who presents to the office today for the evaluation of back pain neck pain going down arms mostly on the right, associated with right sided headache, scalp tenderness and paresthesia that she attributed to working for [employer] in front of a computer wearing headphone.  there is no specific accident and no worker's comp case set up.  it was only her therapist that told her that it should be worker's comp.  her symptoms started on 3/14/23\par \par she had a cT of the head already that’s essential normal. she has MRI of brain/cervical spine, EMG/NCS of the upper extremities scheduled\par \par \par she has seen pain management already and has been sent for physical therapy.  she tried gabapentin which caused significant drowsiness and naproxen caused GI side effects.

## 2023-04-05 NOTE — ASSESSMENT
[FreeTextEntry1] : Bilateral Carpal Tunnel Syndrome / Cubital Tunnel Syndrome. \par \par - Trial Of Occupational Therapy. \par - if not better, will refer to hand surgery\par \par cervical radiculopathy/cervicogenic headache\par -MRI of the brain/cervical spine, EMG/NCS of the upper extremities as ordered\par - a trial of physical therapy\par -followup with pain management\par -a trial of sumatriptan for headaches\par \par - I Will Follow up with her in Two Months. \par \par \par I, Maxine Flores, Attest that this documentation has been prepared under the direction and in the presence of Provider Jaguar Roldan DO\par \par Thank You for letting me assist in the management of this patient. \par \par Jaguar Roldan DO\par Board Certified, Neurology\par

## 2023-04-07 ENCOUNTER — APPOINTMENT (OUTPATIENT)
Dept: PAIN MANAGEMENT | Facility: CLINIC | Age: 48
End: 2023-04-07
Payer: OTHER MISCELLANEOUS

## 2023-04-07 PROCEDURE — 95886 MUSC TEST DONE W/N TEST COMP: CPT

## 2023-04-07 PROCEDURE — 95911 NRV CNDJ TEST 9-10 STUDIES: CPT

## 2023-04-28 ENCOUNTER — APPOINTMENT (OUTPATIENT)
Dept: PAIN MANAGEMENT | Facility: CLINIC | Age: 48
End: 2023-04-28
Payer: OTHER MISCELLANEOUS

## 2023-04-28 VITALS — WEIGHT: 157 LBS | BODY MASS INDEX: 31.65 KG/M2 | HEIGHT: 59 IN

## 2023-04-28 PROCEDURE — 99213 OFFICE O/P EST LOW 20 MIN: CPT

## 2023-04-28 NOTE — DATA REVIEWED
[FreeTextEntry1] : EMG/NCV of the upper extremities dated 4/07/23 is normal. \par \par MRI of the cervical spine dated  04/07/2023 finds straightening of lordosis suggest spasm.  Multilevel cervical spondylosis as follows C3-4 disc bulge without central stenosis.  C4-5 central disc herniation with mild central canal stenosis.  C5-6 right paracentral disc herniation with mild central canal stenosis.  C6-7 disc bulge with mild central stenosis.\par \par CT of the brain dated 03/17/2023 finds evidence of intracranial hemorrhage.  6 mm  calcification is seen along the posterior left parietal parasagittal region, which may simply relate to a falx calcifaction, but small meningioma is also diagnostic considereation. Recommend MRI scan of the without and with contrast for further evaluation. \par \par MRI of the cervical spine dated 7/21/21 finds Mild multilevel degenerative changes and disc bulges not significantly changed since prior examination without significant spinal canal or neural foraminal narrowing.\par \par SOAPP:  low on 4/28/23\par Low risk: Patient has combination of a low risk SOAP and no risk factors. UDS would be repeated randomly every quarter.\par \par UDS: No data obtained today.\par \par [place]: Checked.

## 2023-04-28 NOTE — ASSESSMENT
[FreeTextEntry1] : This is a 47 year old female with a chief complaint of bilateral neck pain with pain around right ear. She is also experiencing headaches. She has been attending PT sessions through her PCP since 3/14/23. PT sessions have  improved tension but she continues to have tightness in the muscles.  She is also experiencing numbness in the bilateral hands.  She is  advised to continue with physical therapy and start occupational therapy and follow up in 6 weeks for follow-up for re-evaluation. All this patients questions were answered and the conversation was understood well.\par \par \par THA, Yamila Don, attest that this documentation has been prepared under the direction and in the presence of Provider Vidhi Walker MD.\par \par \par Thank you for allowing me to assist in the management of this patient. \par \par \par Best Regards, \par \par \par Vidhi Walker M.D., FAAPMR\par \par \par Diplomate, American Board of Physical Medicine and Rehabilitation\par Diplomate, American Board of Pain Medicine \par Diplomate, American Board of Pain Management\par

## 2023-04-28 NOTE — PHYSICAL EXAM
[Normal Coordination] : normal coordination [Normal DTR UE/LE] : normal DTR UE/LE  [Normal Sensation] : normal sensation [Orientated] : orientated [Normal Mood and Affect] : normal mood and affect [Able to Communicate] : able to communicate [Well Developed] : well developed [Well Nourished] : well nourished [] : no palpable masses

## 2023-04-28 NOTE — HISTORY OF PRESENT ILLNESS
[FreeTextEntry1] : ORIGINAL PRESENTATION:  This is a 47 year old female, presenting as a walk in, here to establish care for bilateral neck pain with pain around the ear. She works as a  and states that on 3/14/23 she expererienced pain in her neck and numbness in her hands. She is also experiencing headaches which are quite bothersome. This pain has been ongoing for several years. She experiences pain in the right ear. The neck pain radiates into the bilateral upper extremities, R>L.  There is numbness/tingling in the right arm. She has been attending PT sessions for the past 2 weeks which provides her no relief of her symptoms.  The pain is constant and makes it difficult for her to sleep. \par \par  The patient has had this pain for 2 months. The pain started after injury at work Patient describes pain as severe. During the last month the pain has been worse during the day no typical pattern. Pain described as burning, sharp, dull/aching, shooting. Pain is associated with numbness and pins and needles into the upper extremities exercise, relaxation. Pain is not changed with lying down, sitting, standing, walking, coughing/sneezing, bowel movements. Bowel movements have become less frequent.\par \par ACTIVITIES: Patient could walk 7 blocks before the pain starts. The patient often lays down because of pain. Patient uses no assistive walking device at this time. Patient has difficulty going to work, performing household chores, socializing with friends, exercising at this time.\par \par PRIOR PAIN TREATMENTS: Moderate relief with physical therapy.\par \par PRIOR PAIN MEDICATIONS:  Naproxen.\par \par PATIENT PRESENTS FOR FOLLOW UP: She is under care for bilateral neck pain with numbness into the bilateral upper extremities, right greater than left. Her case is currently under workers comp.  MRI of the cervical spine and the EMG of the  upper extremities was reviewed with the patient and documented below.   She has been trialing PT under her PCP and has been attending  sessions ever since 03/14/2023.  she states the "tension" in her neck has improved but the muscles are still tight. She was approved for occupational therapy under Dr. Roldan and has yet to start. She has not returned to work and she wants to trial occupational therapy first.

## 2023-06-07 ENCOUNTER — APPOINTMENT (OUTPATIENT)
Dept: NEUROLOGY | Facility: CLINIC | Age: 48
End: 2023-06-07
Payer: OTHER MISCELLANEOUS

## 2023-06-07 PROCEDURE — 99214 OFFICE O/P EST MOD 30 MIN: CPT

## 2023-06-08 NOTE — HISTORY OF PRESENT ILLNESS
[FreeTextEntry1] : Ms. TIMMY ZAMORA returns to the office for follow-up and her prior history and physical have been reviewed and she reports no change since last visit.  She was last seen for acute on chronic cervical/lumbar radiculopathy which she attributed to her occupation as a  for [employer] sitting all day in front of a computer wearing headphone taking 80calls a day for years.  She has had all the symptoms in the past but it acutely worsened on 3/14/23 that she could not long work due mostly to the neck pain, headache, hand numbness.  back has worsened but not as bad as the neck therefore, she did not complain to pain management nor neurology the first time she saw us.  According to her, WC has establish both neck and back as the body part in this case.  She has started physical therapy for her neck with little relief.  she still reports a baseline pain in the neck rated at 8-9/10, and 6/10 in the back.  She still get hand numbness intermittently.  She has trouble doing house chores because of her symptoms.\par \par She has had the MRI of the brain which was normal, cervical/lumbar  spine showe bulging/herniated discs at  c4-5, c5-6, c6-7  and L3-4. L4-5, and L5-S1 levels.   EMG/NCS of the upper extremities was also done and it  was normal.\par \par \par She had tried sumtripan without improvement, gabapentin 100mg 2 tabs at night given by her pmd did help with sleep but not pain.\par

## 2023-06-08 NOTE — ASSESSMENT
[FreeTextEntry1] : cervical/lumbar radiculopathy\par \par since she has done MRI of the brain/cervical/lumbar spine as well as EMG/NCS of the upper extremities already, the only left to be done to complete the workup would be EMG/NCS of the lower extremities.  no further neurological workup after that. she should mainly follow up with pain management for pain control, and when that fails return to neurology to discuss about the option of surgery or be referred to neurosurgery directly.\par \par bilaterally carpal tunnel syndome\par -even though EMG/NCS of the upper extremities was negative for carpal tunnel syndrome, but clinically she does have it.  wrists have not been establish body parts so I urge her to talk to  regarding this.\par \par \par hersymptoms are causally related to the work related accident she sustained, and she is considered to have temporary total disability and is not recommended to go back to work at this time.\par \par \par  \par \par \par I, Maxine Flores, Attest that this documentation has been prepared under the direction and in the presence of Provider Dhiraj Noriega \par Thank You for letting me assist in the management of this patient. \par \par Jaguar Roldan DO\shemar Board Certified, Neurology\par

## 2023-06-08 NOTE — PHYSICAL EXAM

## 2023-06-09 ENCOUNTER — APPOINTMENT (OUTPATIENT)
Dept: PAIN MANAGEMENT | Facility: CLINIC | Age: 48
End: 2023-06-09
Payer: OTHER MISCELLANEOUS

## 2023-06-09 VITALS — BODY MASS INDEX: 31.65 KG/M2 | HEIGHT: 59 IN | WEIGHT: 157 LBS

## 2023-06-09 PROCEDURE — 99214 OFFICE O/P EST MOD 30 MIN: CPT

## 2023-06-09 NOTE — ASSESSMENT
[FreeTextEntry1] : This is a 47 year old female with a chief complaint of bilateral neck pain with pain around right ear. She is also experiencing headaches. She has been attending PT sessions through her PCP since 3/14/23. PT sessions have  improved tension but she continues to have tightness in the muscles and numbness/pain in the right arm/hand. We will proceed with a ALEXIS x1 w/ mac and follow up in 4 weeks. In the interim, she will continue with PT and is hopeful that occupational therapy will be approved. All this patients questions were answered and the conversation was understood well.\par \par Patient had a MRI that shows a radicular component along with pain referred into the upper extremity. Patient has trialed rehab (Home  exercise, physical therapy or chiropractic care) and medications.  I will schedule a cervical epidural steroid  1-3 depending on effectiveness.\par \par ANESTHESIA PARAGRAPH: The patient has severe anxiety of procedures that necessitates monitored anesthesia care (MAC). The procedure performed will be close to major nerves, arteries, and spinal cord and/or joint structures. Due to the proximity of these structures, we need the patient to be still during the procedure. With the help of MAC, this will be safely achieved and decrease the risk of any complications.\par \par RISK AND BENEFIT PARAGRAPH: Risk, benefits, pros and cons of procedure were explained to the patient using models and diagrams and their questions were answered.\par \par \par I, Yamila Don, attest that this documentation has been prepared under the direction and in the presence of Provider Vidhi Walker MD.\par \par \par Thank you for allowing me to assist in the management of this patient. \par \par \par Best Regards, \par \par \par Vidhi Walker M.D., FAAPMR\par \par \par Diplomate, American Board of Physical Medicine and Rehabilitation\par Diplomate, American Board of Pain Medicine \par Diplomate, American Board of Pain Management\par

## 2023-06-09 NOTE — HISTORY OF PRESENT ILLNESS
[FreeTextEntry1] : ORIGINAL PRESENTATION:  This is a 47 year old female, presenting as a walk in, here to establish care for bilateral neck pain with pain around the ear. She works as a  and states that on 3/14/23 she experienced pain in her neck and numbness in her hands. She is also experiencing headaches which are quite bothersome. This pain has been ongoing for several years. She experiences pain in the right ear. The neck pain radiates into the bilateral upper extremities, R>L.  There is numbness/tingling in the right arm. She has been attending PT sessions for the past 2 weeks which provides her no relief of her symptoms.  The pain is constant and makes it difficult for her to sleep. \par \par  The patient has had this pain for 2 months. The pain started after injury at work Patient describes pain as severe. During the last month the pain has been worse during the day no typical pattern. Pain described as burning, sharp, dull/aching, shooting. Pain is associated with numbness and pins and needles into the upper extremities exercise, relaxation. Pain is not changed with lying down, sitting, standing, walking, coughing/sneezing, bowel movements. Bowel movements have become less frequent.\par \par ACTIVITIES: Patient could walk 7 blocks before the pain starts. The patient often lays down because of pain. Patient uses no assistive walking device at this time. Patient has difficulty going to work, performing household chores, socializing with friends, exercising at this time.\par \par PRIOR PAIN TREATMENTS: Moderate relief with physical therapy.\par \par PRIOR PAIN MEDICATIONS:  Naproxen.\par \par PATIENT PRESENTS FOR FOLLOW UP: She is under care for bilateral neck pain with numbness into the bilateral upper extremities, right greater than left. Her case is currently under workers comp.  MRI of the cervical spine and the EMG of the  upper extremities was reviewed with the patient and documented below.   She has been trialing PT under her PCP and has been attending  sessions ever since 03/14/2023.  she states the "tension" in her neck has improved but the muscles are still tight. She continues to experience numbness and pain in the right arm and hand and face. She is pending approval for occupational therapy under Dr. Roldan.  In the interim, the option to undergo cervical epidural steroid injection was discussed to address the radicular symptoms in the right arm/hand and she is agreeable to move forward. 
No

## 2023-06-13 ENCOUNTER — FORM ENCOUNTER (OUTPATIENT)
Age: 48
End: 2023-06-13

## 2023-07-04 ENCOUNTER — FORM ENCOUNTER (OUTPATIENT)
Age: 48
End: 2023-07-04

## 2023-07-14 ENCOUNTER — APPOINTMENT (OUTPATIENT)
Dept: PAIN MANAGEMENT | Facility: CLINIC | Age: 48
End: 2023-07-14

## 2023-07-25 ENCOUNTER — EMERGENCY (EMERGENCY)
Facility: HOSPITAL | Age: 48
LOS: 0 days | Discharge: HOME | End: 2023-07-25
Attending: EMERGENCY MEDICINE
Payer: COMMERCIAL

## 2023-07-25 VITALS
TEMPERATURE: 98 F | RESPIRATION RATE: 18 BRPM | DIASTOLIC BLOOD PRESSURE: 67 MMHG | SYSTOLIC BLOOD PRESSURE: 133 MMHG | HEIGHT: 59 IN | OXYGEN SATURATION: 99 % | WEIGHT: 148.81 LBS | HEART RATE: 82 BPM

## 2023-07-25 DIAGNOSIS — M54.2 CERVICALGIA: ICD-10-CM

## 2023-07-25 DIAGNOSIS — M62.838 OTHER MUSCLE SPASM: ICD-10-CM

## 2023-07-25 DIAGNOSIS — Z98.890 OTHER SPECIFIED POSTPROCEDURAL STATES: Chronic | ICD-10-CM

## 2023-07-25 DIAGNOSIS — R51.9 HEADACHE, UNSPECIFIED: ICD-10-CM

## 2023-07-25 DIAGNOSIS — F41.9 ANXIETY DISORDER, UNSPECIFIED: ICD-10-CM

## 2023-07-25 DIAGNOSIS — R20.0 ANESTHESIA OF SKIN: ICD-10-CM

## 2023-07-25 DIAGNOSIS — G43.909 MIGRAINE, UNSPECIFIED, NOT INTRACTABLE, WITHOUT STATUS MIGRAINOSUS: ICD-10-CM

## 2023-07-25 DIAGNOSIS — M48.02 SPINAL STENOSIS, CERVICAL REGION: ICD-10-CM

## 2023-07-25 DIAGNOSIS — Z98.891 HISTORY OF UTERINE SCAR FROM PREVIOUS SURGERY: Chronic | ICD-10-CM

## 2023-07-25 DIAGNOSIS — R20.2 PARESTHESIA OF SKIN: ICD-10-CM

## 2023-07-25 DIAGNOSIS — R53.1 WEAKNESS: ICD-10-CM

## 2023-07-25 PROCEDURE — 72125 CT NECK SPINE W/O DYE: CPT | Mod: 26,MA

## 2023-07-25 PROCEDURE — 72125 CT NECK SPINE W/O DYE: CPT | Mod: MA

## 2023-07-25 PROCEDURE — 99284 EMERGENCY DEPT VISIT MOD MDM: CPT | Mod: 25

## 2023-07-25 PROCEDURE — 70450 CT HEAD/BRAIN W/O DYE: CPT | Mod: 26,MA

## 2023-07-25 PROCEDURE — 96374 THER/PROPH/DIAG INJ IV PUSH: CPT

## 2023-07-25 PROCEDURE — 99284 EMERGENCY DEPT VISIT MOD MDM: CPT

## 2023-07-25 PROCEDURE — 70450 CT HEAD/BRAIN W/O DYE: CPT | Mod: MA

## 2023-07-25 RX ORDER — LIDOCAINE 4 G/100G
1 CREAM TOPICAL ONCE
Refills: 0 | Status: COMPLETED | OUTPATIENT
Start: 2023-07-25 | End: 2023-07-25

## 2023-07-25 RX ORDER — SODIUM CHLORIDE 9 MG/ML
1000 INJECTION INTRAMUSCULAR; INTRAVENOUS; SUBCUTANEOUS ONCE
Refills: 0 | Status: COMPLETED | OUTPATIENT
Start: 2023-07-25 | End: 2023-07-25

## 2023-07-25 RX ORDER — METOCLOPRAMIDE HCL 10 MG
10 TABLET ORAL ONCE
Refills: 0 | Status: COMPLETED | OUTPATIENT
Start: 2023-07-25 | End: 2023-07-25

## 2023-07-25 RX ORDER — METOCLOPRAMIDE HCL 10 MG
10 TABLET ORAL ONCE
Refills: 0 | Status: DISCONTINUED | OUTPATIENT
Start: 2023-07-25 | End: 2023-07-25

## 2023-07-25 RX ORDER — ACETAMINOPHEN 500 MG
650 TABLET ORAL ONCE
Refills: 0 | Status: COMPLETED | OUTPATIENT
Start: 2023-07-25 | End: 2023-07-25

## 2023-07-25 RX ADMIN — Medication 104 MILLIGRAM(S): at 11:41

## 2023-07-25 RX ADMIN — LIDOCAINE 1 PATCH: 4 CREAM TOPICAL at 11:53

## 2023-07-25 RX ADMIN — Medication 650 MILLIGRAM(S): at 11:41

## 2023-07-25 RX ADMIN — SODIUM CHLORIDE 1000 MILLILITER(S): 9 INJECTION INTRAMUSCULAR; INTRAVENOUS; SUBCUTANEOUS at 11:41

## 2023-07-25 NOTE — ED ADULT NURSE NOTE - NSFALLUNIVINTERV_ED_ALL_ED
Bed/Stretcher in lowest position, wheels locked, appropriate side rails in place/Call bell, personal items and telephone in reach/Instruct patient to call for assistance before getting out of bed/chair/stretcher/Non-slip footwear applied when patient is off stretcher/[place] to call system/Physically safe environment - no spills, clutter or unnecessary equipment/Purposeful proactive rounding/Room/bathroom lighting operational, light cord in reach

## 2023-07-25 NOTE — ED ADULT TRIAGE NOTE - SPO2 (%)
[FreeTextEntry1] : Labs 8/30/19\par \par Glucose 101\par HgA1C 6.1\par \par BUN 36\par Creatinine 1.23\par eGFR 45\par \par AST 16\par ALT 14\par \par Total cholesterol 139\par Triglycerides 253\par HDL 38\par LDL 50\par \par Uric acid 6.6 
99

## 2023-07-25 NOTE — ED PROVIDER NOTE - CLINICAL SUMMARY MEDICAL DECISION MAKING FREE TEXT BOX
Patient with history of migraines and neck pain presents for evaluation of worsening headache neck pain.  She also endorses bilateral hand tingling occasionally dropping things from her hands.  She is no focal weakness on exam and her exam is unremarkable.  Here CT imaging of the head and neck no acute pathology.  Patient found to have multiple disc osteophyte complexes and cervical spine advised outpatient neurosurgery f/u.

## 2023-07-25 NOTE — ED PROVIDER NOTE - PHYSICAL EXAMINATION
VITAL SIGNS: I have reviewed nursing notes and confirm.  CONSTITUTIONAL: well-appearing, non-toxic, NAD  SKIN: Warm dry, normal skin turgor  HEAD: NCAT  EYES: EOMI, PERRLA, no scleral icterus  ENT: Moist mucous membranes, normal pharynx with no erythema or exudates  NECK: Supple; Right cervical tenderness, muscle spasm present. Full ROM. No cervical LAD  CARD: RRR, no murmurs, rubs or gallops  RESP: clear to ausculation b/l.  No rales, rhonchi, or wheezing.  ABD: soft, + BS, non-tender, non-distended, no rebound or guarding. No CVA tenderness  EXT: Full ROM, no bony tenderness, no pedal edema, no calf tenderness  NEURO: normal motor. normal sensory. CN II-XII intact. Cerebellar testing normal. Normal gait.  PSYCH: Cooperative, appropriate.

## 2023-07-25 NOTE — ED PROVIDER NOTE - PATIENT PORTAL LINK FT
You can access the FollowMyHealth Patient Portal offered by [facility] by registering at the following website: [email or web address]. By joining [employer]’s FollowMyHealth portal, you will also be able to view your health information using other applications (apps) compatible with our system.

## 2023-07-25 NOTE — ED PROVIDER NOTE - NSFOLLOWUPINSTRUCTIONS_ED_ALL_ED_FT
Our Emergency Department Referral Coordinators will be reaching out to you in the next 24-48 hours from 9:00am to 5:00pm with a follow up appointment. Please expect a phone call from the hospital in that time frame. If you do not receive a call or if you have any questions or concerns, you can reach them at   [phone]    Acute Neck Pain    WHAT YOU NEED TO KNOW:    Acute neck pain starts suddenly, increases quickly, and goes away in a few days. The pain may come and go, or be worse with certain movements. The pain may be only in your neck, or it may move to your arms, back, or shoulders. You may also have pain that starts in another body area and moves to your neck. Vertebral Column         DISCHARGE INSTRUCTIONS:    Return to the emergency department if:     You have an injury that causes neck pain and shooting pain down your arms or legs.      Your neck pain suddenly becomes severe.      You have neck pain along with numbness, tingling, or weakness in your arms or legs.      You have a stiff neck, a headache, and a fever.    Contact your healthcare provider if:     You have new or worsening symptoms.      Your symptoms continue even after treatment.      You have questions or concerns about your condition or care.    Medicines:     NSAIDs, such as ibuprofen, help decrease swelling, pain, and fever. This medicine is available without a doctor's order. Ask your healthcare provider which medicine to take and how often to take it. Follow directions. NSAIDs can cause stomach bleeding or kidney problems if not taken correctly. If you take blood thinner medicine, always ask if NSAIDs are safe for you.      Acetaminophen helps decrease pain and fever. Ask your healthcare provider how much to take and how often to take it. Follow directions. Acetaminophen can cause liver damage if not taken correctly.      Steroid medicine may be used to reduce inflammation. This can help relieve pain caused by swelling.      Take your medicine as directed. Contact your healthcare provider if you think your medicine is not helping or if you have side effects. Tell him or her if you are allergic to any medicine. Keep a list of the medicines, vitamins, and herbs you take. Include the amounts, and when and why you take them. Bring the list or the pill bottles to follow-up visits. Carry your medicine list with you in case of an emergency.    Manage or prevent acute neck pain:     Rest your neck as directed. Do not make sudden movements, such as turning your head quickly. Your healthcare provider may recommend you wear a cervical collar for a short time. The collar will prevent you from moving your head. This will give your neck time to heal if an injury is causing your neck pain. Ask your healthcare provider when you can return to sports or other normal daily activities.      Apply heat as directed. Heat helps relieve pain and swelling. Use a heat wrap, or soak a small towel in warm water. Wring out the extra water. Apply the heat wrap or towel for 20 minutes every hour, or as directed.      Apply ice as directed. Ice helps relieve pain and swelling, and can help prevent tissue damage. Use an ice pack, or put ice in a bag. Cover the ice pack or back with a towel before you apply it to your neck. Apply the ice pack or ice for 15 minutes every hour, or as directed. Your healthcare provider can tell you how often to apply ice.      Do neck exercises as directed. Neck exercises help strengthen the muscles and increase range of motion. Your healthcare provider will tell you which exercises are right for you. He may give you instructions, or he may recommend that you work with a physical therapist. Your healthcare provider or therapist can make sure you are doing the exercises correctly.       Maintain good posture. Try to keep your head and shoulders lifted when you sit. If you work in front of a computer, make sure the monitor is at the right level. You should not need to look up down to see the screen. You should also not have to lean forward to be able to read what is on the screen. Make sure your keyboard, mouse, and other computer items are placed where you do not have to extend your shoulder to reach them. Get up often if you work in front of a computer or sit for long periods of time. Stretch or walk around to keep your neck muscles loose.    Follow up with your healthcare provider as directed: Your healthcare provider may refer you to a specialist if your pain does not get better with treatment. Write down your questions so you remember to ask them during your visits.       © Copyright [employer] 2019 All illustrations and images included in CareNotes are the copyrighted property of [employer] or [employer].

## 2023-07-25 NOTE — ED PROVIDER NOTE - OBJECTIVE STATEMENT
47-year-old female with past medical history of anxiety, chronic neck pain, history of migraines who presents for worse headache and neck pain since yesterday.  Patient states that she tried taking naproxen and muscle relaxer yesterday states it did not help.  Endorses bilateral hand tingling and weakness.  States the pain occasionally radiates down her back.  No fevers, chills, nausea, vomiting, vision changes, abdominal pain, chest pain, shortness of breath, palpitations.

## 2023-08-09 ENCOUNTER — APPOINTMENT (OUTPATIENT)
Dept: NEUROLOGY | Facility: CLINIC | Age: 48
End: 2023-08-09
Payer: OTHER MISCELLANEOUS

## 2023-08-09 VITALS — BODY MASS INDEX: 30.04 KG/M2 | HEIGHT: 59 IN | WEIGHT: 149 LBS

## 2023-08-09 PROCEDURE — 99214 OFFICE O/P EST MOD 30 MIN: CPT | Mod: ACP

## 2023-08-09 NOTE — REVIEW OF SYSTEMS
[Hand Weakness] :  hand weakness [Leg Weakness] : no leg weakness [Poor Coordination] : good coordination [Numbness] : numbness [Tingling] : tingling [Abnormal Sensation] : an abnormal sensation [Negative] : Heme/Lymph

## 2023-08-09 NOTE — PHYSICAL EXAM
[General Appearance - Alert] : alert [General Appearance - In No Acute Distress] : in no acute distress [Oriented To Time, Place, And Person] : oriented to person, place, and time [Impaired Insight] : insight and judgment were intact [Affect] : the affect was normal [Person] : oriented to person [Place] : oriented to place [Time] : oriented to time [Concentration Intact] : normal concentrating ability [Visual Intact] : visual attention was ~T not ~L decreased [Naming Objects] : no difficulty naming common objects [Repeating Phrases] : no difficulty repeating a phrase [Writing A Sentence] : no difficulty writing a sentence [Fluency] : fluency intact [Comprehension] : comprehension intact [Reading] : reading intact [Past History] : adequate knowledge of personal past history [Cranial Nerves Optic (II)] : visual acuity intact bilaterally,  visual fields full to confrontation, pupils equal round and reactive to light [Cranial Nerves Oculomotor (III)] : extraocular motion intact [Cranial Nerves Trigeminal (V)] : facial sensation intact symmetrically [Cranial Nerves Facial (VII)] : face symmetrical [Cranial Nerves Vestibulocochlear (VIII)] : hearing was intact bilaterally [Cranial Nerves Glossopharyngeal (IX)] : tongue and palate midline [Cranial Nerves Accessory (XI - Cranial And Spinal)] : head turning and shoulder shrug symmetric [Cranial Nerves Hypoglossal (XII)] : there was no tongue deviation with protrusion [Motor Tone] : muscle tone was normal in all four extremities [No Muscle Atrophy] : normal bulk in all four extremities [Motor Strength Upper Extremities Bilaterally] : there was weakness in both upper extremities [Sensation Tactile Decrease] : light touch was intact [Balance] : balance was intact [Past-pointing] : there was no past-pointing [Tremor] : no tremor present [2+] : Ankle jerk left 2+ [Plantar Reflex Right Only] : normal on the right [Plantar Reflex Left Only] : normal on the left [FreeTextEntry6] : Hand  4-/5 on right 4+/5 on left, 5/5 proximal bilaterally  [PERRL With Normal Accommodation] : pupils were equal in size, round, reactive to light, with normal accommodation [Extraocular Movements] : extraocular movements were intact [Hearing Threshold Finger Rub Not [place]] : hearing was normal [Neck Appearance] : the appearance of the neck was normal [FreeTextEntry1] : cervical and lumbar tenderness  [Abnormal Walk] : normal gait [Involuntary Movements] : no involuntary movements were seen

## 2023-08-09 NOTE — HISTORY OF PRESENT ILLNESS
[FreeTextEntry1] :  Ms. TIMMY ZAMORA is a 47 year old female under our care for acute on chronic cervical and lumbar radiculopathy which she attributed to her occupation as a  for [employer] sitting all day in front of a computer. She has had all the symptoms in the past but it acutely worsened on 3/14/23 According to her, WC has establish both neck and back as the body part in this case. She has participated in physical therapy for her neck with little relief and continues to report neck pain rated at 8-9/10, and 6/10 in the back. She reports hand numbness intermittently and migraine headaches triggered by her neck pain. She has trouble doing house chores because of her symptoms.  MRI of the brain was normal  MRI Cervical spine 4.7.23 : C3-4 disc bulge  c4-5 central disc herniation  c5-6 paracentral disc herniation c6-7 disc bulge  MRI L spine : L3-4 disc bulge L4-5, and L5-S1 disc herniation EMG  upper extremities 4.7.23 : normal  Today : Today I had the pleasure of seeing Ms. TIMMY ZAMORA   in our office for follow up.  The patients previous history and physical findings have been reviewed.    Patient remains under our care for chronic cervical and lumbar radiculopathy with multilevel disc herniations noted on MRI. Additionally, she is under the care of pain management for her neck and back and was contemplating intervention with steroid injection. She has been participating in physical therapy for her neck since March 14th and she has not experienced any improvement in her symptoms and was originally recommended occupational therapy for her upper extremities however it was denied by workers comp.  She was further evaluated by workers comp Dr. Benitez who also recommended occupational therapy x 8 weeks 3x per week for her right upper extremity for right ulnar neuropathy.  After reviewing her MRI of the cervical spine and lumbar spine which showed multilevel disc herniations and disc disease I recommend she be evaluated by a Neurosurgeon to discuss potential surgical interventions. On exam she maintains 5/5 power to b/l UE proximally with weak intrinsic function predominantly on the right. Lower extremities remain 5/5 proximally and distally with 2+ reflexes throughout, no muscle atrophy, saddle anesthesia or bowel / bladder incontinence or retention.

## 2023-08-09 NOTE — ASSESSMENT
[FreeTextEntry1] : 47 year old female  patient with chronic cervical and lumbar radiculopathy with multilevel disc herniations noted on MRI. Today we reviewed her WC paper work and notes from their physician Dr. Benitez. I agree with Dr. Murray assessment that Ms Darling should continue Occupational Therapy for her RUE as well as physical therapy for her neck. New OT script ordered in chart. She has follow up scheduled with Dr. Walker and has a consult scheduled with Neurosurgeon Dr. Sterling. She will continue care with the above providers and will follow up with Neurology PRN.   I have personally reviewed with the PA, this patients history and physical exam findings, as documented above. I have discussed the relevant areas of concern, having direct implications to the presenting problems and illnesses, and have personally examined all pertinent findings which impact on the prior neurological treatment.   Nirmala Moralez MS, PA-BRETT Roldan, DO

## 2023-08-24 ENCOUNTER — APPOINTMENT (OUTPATIENT)
Dept: NEUROSURGERY | Facility: CLINIC | Age: 48
End: 2023-08-24
Payer: COMMERCIAL

## 2023-08-24 VITALS — HEIGHT: 59 IN | BODY MASS INDEX: 30.04 KG/M2 | WEIGHT: 149 LBS

## 2023-08-24 PROCEDURE — 99203 OFFICE O/P NEW LOW 30 MIN: CPT

## 2023-08-25 NOTE — PHYSICAL EXAM
[TextEntry] : Physical exam:   Vital Signs - stable  General - well appearing in no acute distress  HEENT-normocephalic atraumatic  Skin-intact  CV-no edema, good distal pulses, radial and DP  Lungs-no wheezing or cyanosis  Musculoskeletal- significant decreased ROM; no tenderness to palpation; negative Spurlings; negative Lhermittes; negative straight leg raise; negative FABERs; negative Tinels  Neuro- awake, alert, and oriented; CN II-XII intact; 5/5 strength; sensation intact; reflexes normal; no Hoffmans or clonus, gait intact

## 2023-08-25 NOTE — HISTORY OF PRESENT ILLNESS
[de-identified] : CHIEF COMPLAINT: Ms. Darling, Presents today with Neck and Lower back pain. Pain radiates down both shoulders, arms and hands. She is experiencing Numbness, tingling and weakness in both hands. She explains feeling numbness in the head as well. She states she has pain in her right knee.  CONSERVATIVE MANAGEMENT TRIALED: She has went to PT which she did about 50 sessions, which gave a short term relief. She was going to try Pain management but was Denied due to insurance.  Mild disc bulges on MRI

## 2023-08-25 NOTE — PLAN
[FreeTextEntry1] : No indication for neurosurgical intervention.  Pain management for injections.  Return as needed.

## 2023-08-29 ENCOUNTER — APPOINTMENT (OUTPATIENT)
Dept: PAIN MANAGEMENT | Facility: CLINIC | Age: 48
End: 2023-08-29
Payer: OTHER MISCELLANEOUS

## 2023-08-29 VITALS
HEART RATE: 84 BPM | DIASTOLIC BLOOD PRESSURE: 75 MMHG | WEIGHT: 149 LBS | SYSTOLIC BLOOD PRESSURE: 114 MMHG | BODY MASS INDEX: 30.04 KG/M2 | HEIGHT: 59 IN

## 2023-08-29 PROCEDURE — 99214 OFFICE O/P EST MOD 30 MIN: CPT

## 2023-08-29 NOTE — HISTORY OF PRESENT ILLNESS
[FreeTextEntry1] : ORIGINAL PRESENTATION:  This is a 47 year old female, presenting as a walk in, here to establish care for bilateral neck pain with pain around the ear. She works as a  and states that on 3/14/23 she experienced pain in her neck and numbness in her hands. She is also experiencing headaches which are quite bothersome. This pain has been ongoing for several years. She experiences pain in the right ear. The neck pain radiates into the bilateral upper extremities, R>L.  There is numbness/tingling in the right arm. She has been attending PT sessions for the past 2 weeks which provides her no relief of her symptoms.  The pain is constant and makes it difficult for her to sleep.    The patient has had this pain for 2 months. The pain started after injury at work Patient describes pain as severe. During the last month the pain has been worse during the day no typical pattern. Pain described as burning, sharp, dull/aching, shooting. Pain is associated with numbness and pins and needles into the upper extremities exercise, relaxation. Pain is not changed with lying down, sitting, standing, walking, coughing/sneezing, bowel movements. Bowel movements have become less frequent.  ACTIVITIES: Patient could walk 7 blocks before the pain starts. The patient often lays down because of pain. Patient uses no assistive walking device at this time. Patient has difficulty going to work, performing household chores, socializing with friends, exercising at this time.  PRIOR PAIN TREATMENTS: Moderate relief with physical therapy.  PRIOR PAIN MEDICATIONS:  Naproxen.  PATIENT PRESENTS FOR FOLLOW UP: She is under care for bilateral neck. She has been trialing PT under her PCP and has been attending sessions ever since 03/14/2023.  She states the "tension" in her neck has improved but the muscles are still tight. She continues to experience numbness and pain in the right arm and hand and face. At her previous visit, we submit for a ALEXIS which was unfortunately denied by . She followed up with neurosurgery, Dr. Sterling, and was advised that surgery is not indicated and was advised to trial the injection therapy. She is requesting that we resubmit for the injection at this time,   Of note, occupational therapy sessions were recently approved and she will begin soon.

## 2023-08-29 NOTE — ASSESSMENT
[FreeTextEntry1] : This is a 47 year old female with a chief complaint of bilateral neck pain with pain around right ear. She is also experiencing headaches. She has been attending PT sessions through her PCP since 3/14/23. PT sessions have improved tension, but she continues to have tightness in the muscles and numbness/pain in the right arm/hand. We will resubmit for a ALEXIS x1 w/ mac and follow up in 4 weeks. In the interim, she will continue with PT and OT. All this patients questions were answered and the conversation was understood well.  Patient had a MRI that shows a radicular component along with pain referred into the upper extremity. Patient has trialed rehab (Home  exercise, physical therapy or chiropractic care) and medications.  I will schedule a cervical epidural steroid  1-3 depending on effectiveness.  ANESTHESIA PARAGRAPH: The patient has severe anxiety of procedures that necessitates monitored anesthesia care (MAC). The procedure performed will be close to major nerves, arteries, and spinal cord and/or joint structures. Due to the proximity of these structures, we need the patient to be still during the procedure. With the help of MAC, this will be safely achieved and decrease the risk of any complications.  RISK AND BENEFIT PARAGRAPH: Risk, benefits, pros and cons of procedure were explained to the patient using models and diagrams and their questions were answered.   I, Yamila Don, attest that this documentation has been prepared under the direction and in the presence of Provider Vidhi Walker MD.   Thank you for allowing me to assist in the management of this patient.    Best Regards,    Vidhi Walker M.D., FAAPMR   Diplomate, American Board of Physical Medicine and Rehabilitation Diplomate, American Board of Pain Medicine  Diplomate, American Board of Pain Management

## 2023-10-04 ENCOUNTER — APPOINTMENT (OUTPATIENT)
Dept: NEUROLOGY | Facility: CLINIC | Age: 48
End: 2023-10-04
Payer: OTHER MISCELLANEOUS

## 2023-10-04 VITALS
BODY MASS INDEX: 34.48 KG/M2 | SYSTOLIC BLOOD PRESSURE: 120 MMHG | DIASTOLIC BLOOD PRESSURE: 85 MMHG | HEART RATE: 78 BPM | HEIGHT: 55 IN | WEIGHT: 149 LBS

## 2023-10-04 PROCEDURE — 99215 OFFICE O/P EST HI 40 MIN: CPT | Mod: ACP

## 2023-10-19 ENCOUNTER — APPOINTMENT (OUTPATIENT)
Dept: NEUROSURGERY | Facility: CLINIC | Age: 48
End: 2023-10-19

## 2023-10-27 ENCOUNTER — APPOINTMENT (OUTPATIENT)
Dept: PAIN MANAGEMENT | Facility: CLINIC | Age: 48
End: 2023-10-27
Payer: OTHER MISCELLANEOUS

## 2023-10-27 VITALS
HEIGHT: 55 IN | SYSTOLIC BLOOD PRESSURE: 121 MMHG | DIASTOLIC BLOOD PRESSURE: 87 MMHG | WEIGHT: 149 LBS | BODY MASS INDEX: 34.48 KG/M2 | HEART RATE: 98 BPM

## 2023-10-27 PROCEDURE — 99214 OFFICE O/P EST MOD 30 MIN: CPT

## 2023-10-27 RX ORDER — IBUPROFEN 800 MG/1
800 TABLET, FILM COATED ORAL
Refills: 0 | Status: ACTIVE | COMMUNITY

## 2023-10-27 RX ORDER — GUAIFENESIN 1200 MG/1
500 TABLET, EXTENDED RELEASE ORAL
Refills: 0 | Status: ACTIVE | COMMUNITY

## 2023-11-02 ENCOUNTER — APPOINTMENT (OUTPATIENT)
Dept: PAIN MANAGEMENT | Facility: CLINIC | Age: 48
End: 2023-11-02
Payer: OTHER MISCELLANEOUS

## 2023-11-02 PROCEDURE — 93770 DETERMINATION VENOUS PRESS: CPT

## 2023-11-02 PROCEDURE — 94761 N-INVAS EAR/PLS OXIMETRY MLT: CPT

## 2023-11-02 PROCEDURE — 93040 RHYTHM ECG WITH REPORT: CPT | Mod: 79

## 2023-11-02 PROCEDURE — 00600 ANES PX CRV SPINE&CORD NOS: CPT | Mod: QZ,P2

## 2023-11-02 PROCEDURE — 62321 NJX INTERLAMINAR CRV/THRC: CPT

## 2023-12-01 ENCOUNTER — APPOINTMENT (OUTPATIENT)
Dept: PAIN MANAGEMENT | Facility: CLINIC | Age: 48
End: 2023-12-01
Payer: OTHER MISCELLANEOUS

## 2023-12-01 VITALS — BODY MASS INDEX: 34.48 KG/M2 | WEIGHT: 149 LBS | HEIGHT: 55 IN

## 2023-12-01 PROCEDURE — 99214 OFFICE O/P EST MOD 30 MIN: CPT | Mod: ACP

## 2023-12-13 NOTE — HISTORY OF PRESENT ILLNESS
[FreeTextEntry1] : ORIGINAL PRESENTATION:  This is a 47 year old female, presenting as a walk in, here to establish care for bilateral neck pain with pain around the ear. She works as a  and states that on 3/14/23 she experienced pain in her neck and numbness in her hands. She is also experiencing headaches which are quite bothersome. This pain has been ongoing for several years. She experiences pain in the right ear. The neck pain radiates into the bilateral upper extremities, R>L.  There is numbness/tingling in the right arm. She has been attending PT sessions for the past 2 weeks which provides her no relief of her symptoms.  The pain is constant and makes it difficult for her to sleep.    The patient has had this pain for 2 months. The pain started after injury at work Patient describes pain as severe. During the last month the pain has been worse during the day no typical pattern. Pain described as burning, sharp, dull/aching, shooting. Pain is associated with numbness and pins and needles into the upper extremities exercise, relaxation. Pain is not changed with lying down, sitting, standing, walking, coughing/sneezing, bowel movements. Bowel movements have become less frequent.  ACTIVITIES: Patient could walk 7 blocks before the pain starts. The patient often lays down because of pain. Patient uses no assistive walking device at this time. Patient has difficulty going to work, performing household chores, socializing with friends, exercising at this time.  PRIOR PAIN TREATMENTS: Moderate relief with physical therapy.  PRIOR PAIN MEDICATIONS:  Naproxen.  PATIENT PRESENTS FOR FOLLOW UP: She is under our care for bilateral neck pain. She complains of severe stiffness and spasms affecting the cervical spine. She continues to experience numbness and pain in the right arm and hand and face. She is most recently s/p a ALEXIS w/MAC which provided little to no relief. She continues to have pain mostly muscular in nature. We discussed trialing a cervical trigger point injection as an alternative and she is agreeable. She is currently utilizing gabapentin 300 mg in conjunction with cyclobenzaprine for relief.

## 2023-12-13 NOTE — ASSESSMENT
[FreeTextEntry1] : This is a 48-year-old female with a chief complaint of bilateral neck pain with pain around right ear. She is also experiencing headaches. She has been attending PT sessions through her PCP since 3/14/23. She is most recently s/p a ALEXIS w/MAC which provided little to no relief. She continues to deal with muscle stiffness and spams. Physical exam revealed multiple palpable trigger points, taught bands, and twitch reflex to light and deep palpation of the cervical spine. We will submit for cervical TPIs 1-3 to target this pain. We will provide her with an acupuncture referral. All this patient's questions were answered, and the conversation was understood well.  Patient is temporarily moderately disabled. She is unable to work at this time.  DAVID Goodson MD

## 2023-12-13 NOTE — PHYSICAL EXAM
[] : no palpable masses [FreeTextEntry8] : Severe degree of palpable trigger points. +Twitch reflex to light and deep palpation. Taught bands present

## 2023-12-20 ENCOUNTER — APPOINTMENT (OUTPATIENT)
Dept: PAIN MANAGEMENT | Facility: CLINIC | Age: 48
End: 2023-12-20
Payer: OTHER MISCELLANEOUS

## 2023-12-20 PROCEDURE — 20553 NJX 1/MLT TRIGGER POINTS 3/>: CPT

## 2023-12-20 PROCEDURE — 76942 ECHO GUIDE FOR BIOPSY: CPT

## 2023-12-20 NOTE — PROCEDURE
[FreeTextEntry3] :  Trigger Point Injection under Ultrasound Guidance    Date:  2023  Patient: Lee Darling  :  1975  Preoperative diagnosis:         Trigger points/ Myalgia                         Postoperative diagnosis:       Same                                                     Procedure:                   Cervical Trigger point injections with ultrasound guidance.                                      Trigger point injections, 3 or more muscles:                                      Ultrasound Guidance:                                         Physician:                  Vidhi Walker MD, FAAPMR                                                                                                  Indications for Ultrasound:   Accurate percutaneous needle placement requires image guidance to prevent neuro/vascular injury or intravascular injection, as well as to avoid lung injury.    Medical Necessity:                 Failure of conservative management     Indication: The patient presents with a distribution of pain consistent with trigger point pathology.  On palpation and with ultrasound examination there is focal tenderness and a palpable taut band of muscle with restriction of motion.  Noninvasive treatment modalities such as rest, heat/ice and stretching have been ineffective.  The pain has been present for greater than 3 months.    CONSENT: The possible complications including infection, bleeding, nerve damage, pneumothorax (collapsed lung), hospital admission or failure of the procedure; though unusual, are theoretically possible. The patient was educated about the of the procedure and alternative therapies. All questions were answered and the patient freely gave consent to proceed.     PROCEDURE NOTE:  After obtaining written consent, the patient was placed on the fluoroscopic table in the prone position. Areas of point tenderness in the muscles were identified and prepped with Hibiclens in a sterile fashion.  Using continuous ultrasound guidance for needle localization, multiple trigger points in three or more muscle groups were injected with a 25-gauge 1.5" needle using 5ML 0.50% BUPIVACAINE with 5ML of 2% LIDOCAINE local anesthetic. The direction of needle insertion was kept as parallel to the skin as possible for injections over the thoracic region to avoid directing the needle tip towards the lung. All the needles were removed intact.      Ultrasound findings: No calcifications     Disposition: I have examined the patient and there are no new physical findings since the original presentation.  Sensory and motor function were intact.     Comment: 1st trigger point today, depending on effectiveness and insurance would repeat in 1 week or follow up in office in 1 week. Call if any problems.       This document was electronically signed by:    Vidhi Walker MD, FAAPMR Diplomate, American Board of Physical Medicine and Rehabilitation Diplomate, American Board of Pain Medicine

## 2024-01-04 ENCOUNTER — APPOINTMENT (OUTPATIENT)
Dept: PAIN MANAGEMENT | Facility: CLINIC | Age: 49
End: 2024-01-04
Payer: OTHER MISCELLANEOUS

## 2024-01-04 DIAGNOSIS — M54.41 LUMBAGO WITH SCIATICA, RIGHT SIDE: ICD-10-CM

## 2024-01-04 DIAGNOSIS — G89.29 LUMBAGO WITH SCIATICA, RIGHT SIDE: ICD-10-CM

## 2024-01-04 PROCEDURE — 99214 OFFICE O/P EST MOD 30 MIN: CPT | Mod: ACP

## 2024-01-04 NOTE — PHYSICAL EXAM
[Normal Coordination] : normal coordination [Normal DTR UE/LE] : normal DTR UE/LE  [Normal Sensation] : normal sensation [Normal Mood and Affect] : normal mood and affect [Orientated] : orientated [Able to Communicate] : able to communicate [Well Developed] : well developed [Well Nourished] : well nourished [FreeTextEntry8] : Severe degree of palpable trigger points. +Twitch reflex to light and deep palpation. Taught bands present  [] : positive sitting straight leg raise

## 2024-01-04 NOTE — HISTORY OF PRESENT ILLNESS
[FreeTextEntry1] : ORIGINAL PRESENTATION:  This is a 47 year old female, presenting as a walk in, here to establish care for bilateral neck pain with pain around the ear. She works as a  and states that on 3/14/23 she experienced pain in her neck and numbness in her hands. She is also experiencing headaches which are quite bothersome. This pain has been ongoing for several years. She experiences pain in the right ear. The neck pain radiates into the bilateral upper extremities, R>L.  There is numbness/tingling in the right arm. She has been attending PT sessions for the past 2 weeks which provides her no relief of her symptoms.  The pain is constant and makes it difficult for her to sleep.    The patient has had this pain for 2 months. The pain started after injury at work Patient describes pain as severe. During the last month the pain has been worse during the day no typical pattern. Pain described as burning, sharp, dull/aching, shooting. Pain is associated with numbness and pins and needles into the upper extremities exercise, relaxation. Pain is not changed with lying down, sitting, standing, walking, coughing/sneezing, bowel movements. Bowel movements have become less frequent.  ACTIVITIES: Patient could walk 7 blocks before the pain starts. The patient often lays down because of pain. Patient uses no assistive walking device at this time. Patient has difficulty going to work, performing household chores, socializing with friends, exercising at this time.  PRIOR PAIN TREATMENTS: Moderate relief with physical therapy.  PRIOR PAIN MEDICATIONS:  Naproxen.  PATIENT PRESENTS FOR FOLLOW UP: She is under our care for bilateral neck pain. She also has chronic lumbar pain with radiculopathy. She is s/p a Cervical TPI on 12/20/23 with at least 70% sustained relief. She noticed that her stiffness and spasms has not been as intense. She wishes to repeat cervical TPIs. A ALEXIS w/MAC which provided little to no relief. She also has lumbar pain with radiation to the bilateral lower extremities R>L. Pain is associated with numbness and tingling. Pain is constant in nature. We discussed trialing a right L4-S1 SNRI w/MAC and she is agreeable. She is currently utilizing gabapentin 300 mg in conjunction with cyclobenzaprine for relief.

## 2024-01-04 NOTE — ASSESSMENT
[FreeTextEntry1] : This is a 48-year-old female with a chief complaint of bilateral neck pain with pain around right ear. She is s/p a Cervical TPI on 12/20/23 with at least 70% sustained relief. Overall, her myofascial pain has improved. We will submit for a second set of cervical TPIs 1-3 to target this pain. She will continue with acupuncture which has been helping. As for her lumbar pain, symptoms and MRI of her lumbar spine point to a radicular component. We will submit for a right L4-L5, L5-S1 SNRI w/MAC for further relief. Patient will f/u in 4 weeks for further evaluation.  Patient had a MRI that shows a radicular component along with pain referred into the lower extremity. Patient has trialed rehab (Home exercise, physical therapy or chiropractic care) and medications I will schedule a right L4-L5, L5-S1 SNRI w/MAC 1-3 depending on effectiveness.  The patient has severe anxiety of procedures that necessitates monitored anesthesia care (MAC). The procedure performed will be close to major nerves, arteries, and spinal cord and/or joint structures. Due to the proximity of these structures, we need the patient to be still during the procedure.  With the help of MAC, this will be safely achieved and decrease the risk of any complications..mac  Total clinician time spent today on the patient is 30 minutes including preparing to see the patient, obtaining and/or reviewing and confirming history, performing medically necessary and appropriate examination, counseling and educating the patient and/or family, documenting clinical information in the EHR and communicating and/or referring to other healthcare professionals.  Patient is temporarily moderately disabled. She is unable to work at this time.  DAVID Goodson MD

## 2024-01-23 ENCOUNTER — APPOINTMENT (OUTPATIENT)
Dept: PAIN MANAGEMENT | Facility: CLINIC | Age: 49
End: 2024-01-23
Payer: OTHER MISCELLANEOUS

## 2024-01-23 DIAGNOSIS — M54.16 RADICULOPATHY, LUMBAR REGION: ICD-10-CM

## 2024-01-23 PROCEDURE — 93770 DETERMINATION VENOUS PRESS: CPT

## 2024-01-23 PROCEDURE — 64483 NJX AA&/STRD TFRM EPI L/S 1: CPT | Mod: RT

## 2024-01-23 PROCEDURE — 93040 RHYTHM ECG WITH REPORT: CPT | Mod: 79

## 2024-01-23 PROCEDURE — 94761 N-INVAS EAR/PLS OXIMETRY MLT: CPT

## 2024-01-23 PROCEDURE — 00630 ANES PX LUMBAR REGION NOS: CPT | Mod: QZ,P2

## 2024-01-23 PROCEDURE — 64484 NJX AA&/STRD TFRM EPI L/S EA: CPT | Mod: RT

## 2024-01-23 NOTE — PROCEDURE
[FreeTextEntry3] : SELECTIVE TRANSFORAMINAL LUMBAR EPIDURAL NERVE ROOT INJECTION     Date:  2024  Patient: Lee Darling  :  1975  Preoperative Diagnosis: Right L5 radiculitis; Right L4 radiculitis  Procedure: Selective Right L4-5; L5-S1 Transforaminal Lumbar Epidural Nerve Root Injection under Fluoroscopy  Phys[facility]: [facility]     Anesthesiologist/CRNA: Ms. Patrick  Anesthesia: See nurses notes/MAC/Cold spray   Medical Necessity:  Failure of conservative management.  Indication for Fluoroscopy:  This procedure requires the precise placement of the spinal needle into the specific paravertebral foramen.  It is the only way to accurately and safely perform the injection.     CONSENT: The possible complications including infection, bleeding, nerve damage, Hospital admission, death or failure of the procedure; though unusual, are theoretically possible. The patient was educated about the of the procedure and alternative therapies. All questions were answered and the patient freely gave consent to proceed.     Monitoring:  Patient had continuous blood pressure, EKG, and pulse oximetry throughout the case. See nurse's notes     PROCEDURE NOTE:  After obtaining written consent, the patient was placed on the fluoroscopic table in the prone position with the pillow placed under the hips. The lumbar area was prepped with betadine solution and draped in the usual manner. A time out was performed.  Cold spray was used to localize the area. The fluoroscope was used to identify the L3///L4///L5 vertebral body on the AP projection. It was then rotated into an oblique projection until the superior articulating process of the L5 (inferior) vertebra is projected beneath the 6 o'clock position of the L4 (superior) vertebrae. The 22 gauge 3.5inch needle was inserted in the skin at a point overlying the superior articulating process of the inferior vertebra and aimed for the 6 o'clock position of the superior vertebrae's pedicle.  After the needle contacted bone, a lateral projection was obtained to insure that the needle tip was in proximity with the vertebral body. Paresthesia's were not noted.  One ml of Omnipaque 240 was injected and a neurogram was obtained. Following demonstration of the neurogram, 1 ml of Preservative free normal saline and 1 ml of depomedrol (40mg) was injected. The small volume and relatively high concentration was chosen to preserve selectivity and diagnostic value of the injection. There was no CSF or heme identified.  The L5-S1 level was injected in identical fashion. There were no signs of, intravascular block or hypotension. The needle was removed intact. A band aid was place on the site.     Epidurogram: The nerve root was observed in its outline on the neurogram. Distal and proximal spread was noted pointing away from epidural fibrosis/scarring.      Complications: None. The patient tolerated the procedure well.      Disposition: I have examined the patient and there are no new physical findings since the original presentation.  Sensory and motor function were intact. The patient met discharge criteria see nurses notes. The discharge instruction sheet was reviewed and given to the patient. The patient was discharged home with a . If patient gets sustained relief will have patient do modified planks 3 times a day on carpet or yoga mat starting at 5 seconds building up to 1 minute without grimacing/Valsalva and walking.      Comment: 1st SNRI today, depending on effectiveness would schedule 2nd SNRI in 1-2 weeks vs caudal epidural steroid vs follow up in office depending on insurances. Follow up office. Call if any problems     This document was electronically signed by:  Vidhi Walker MD, FAAPMR Diplomate, American Board of Physical Medicine and Rehabilitation Diplomate, American Board of Pain Medicine

## 2024-02-02 NOTE — BRIEF OPERATIVE NOTE - COMMENTS
large perfix plug and patch

## 2024-02-14 ENCOUNTER — APPOINTMENT (OUTPATIENT)
Dept: PAIN MANAGEMENT | Facility: CLINIC | Age: 49
End: 2024-02-14
Payer: OTHER MISCELLANEOUS

## 2024-02-14 PROCEDURE — 99214 OFFICE O/P EST MOD 30 MIN: CPT

## 2024-02-14 NOTE — PHYSICAL EXAM
[Normal Coordination] : normal coordination [Normal DTR UE/LE] : normal DTR UE/LE  [Normal Sensation] : normal sensation [Normal Mood and Affect] : normal mood and affect [Oriented] : oriented [Able to Communicate] : able to communicate [Well Developed] : well developed [Well Nourished] : well nourished [] : no palpable masses [FreeTextEntry8] : Severe degree of palpable trigger points. +Twitch reflex to light and deep palpation. Taught bands present

## 2024-02-14 NOTE — ASSESSMENT
[FreeTextEntry1] : This is a 48-year-old female with a chief complaint of bilateral neck pain with pain around right ear. She previously underwent a Cervical TPI on 12/20/23 with at least 70% sustained relief. She wishes to repeat this injection and will be scheduled accordingly. She is s/p a RT L4-S1 TFESI x1 w/ mac on 1/23/24 which provided her with no relief of her symptoms. We will proceed with an alternative approach in the form of a caudal BOBYB with the hopes it provides her better relief of her symptoms. Patient will follow up afterwards for reassessment. All this patients questions were answered and the conversation was understood well.  Patient had a MRI that shows a radicular component along with pain referred into the lower extremity. Patient has trialed rehab (Home exercise, physical therapy or chiropractic care) and medications I will schedule a Caudal 1-3 depending on effectiveness.  The patient has severe anxiety of procedures that necessitates monitored anesthesia care (MAC). The procedure performed will be close to major nerves, arteries, and spinal cord and/or joint structures. Due to the proximity of these structures, we need the patient to be still during the procedure. With the help of MAC, this will be safely achieved and decrease the risk of any complications.  RISK AND BENEFIT PARAGRAPH: Risk, benefits, pros and cons of procedure were explained to the patient using models and diagrams and their questions were answered.  I, Yamila Don, attest that this documentation has been prepared under the direction and in the presence of Provider Vidhi Walker MD.   Thank you for allowing me to assist in the management of this patient.    Best Regards,    Vidhi Walker M.D., FAAPMR   Diplomate, American Board of Physical Medicine and Rehabilitation Diplomate, American Board of Pain Medicine  Diplomate, American Board of Pain Management

## 2024-02-14 NOTE — HISTORY OF PRESENT ILLNESS
[FreeTextEntry1] : ORIGINAL PRESENTATION:  This is a 48 year old female, presenting as a walk in, here to establish care for bilateral neck pain with pain around the ear. She works as a  and states that on 3/14/23 she experienced pain in her neck and numbness in her hands. She is also experiencing headaches which are quite bothersome. This pain has been ongoing for several years. She experiences pain in the right ear. The neck pain radiates into the bilateral upper extremities, R>L.  There is numbness/tingling in the right arm. She has been attending PT sessions for the past 2 weeks which provides her no relief of her symptoms.  The pain is constant and makes it difficult for her to sleep.    The patient has had this pain for 2 months. The pain started after injury at work Patient describes pain as severe. During the last month the pain has been worse during the day no typical pattern. Pain described as burning, sharp, dull/aching, shooting. Pain is associated with numbness and pins and needles into the upper extremities exercise, relaxation. Pain is not changed with lying down, sitting, standing, walking, coughing/sneezing, bowel movements. Bowel movements have become less frequent.  ACTIVITIES: Patient could walk 7 blocks before the pain starts. The patient often lays down because of pain. Patient uses no assistive walking device at this time. Patient has difficulty going to work, performing household chores, socializing with friends, exercising at this time.  PRIOR PAIN TREATMENTS: Moderate relief with physical therapy.  PRIOR PAIN MEDICATIONS:  Naproxen.  PATIENT PRESENTS FOR FOLLOW UP: She is under our care for bilateral neck pain. She also has chronic lumbar pain with radiculopathy. She previously underwent a Cervical TPI on 12/20/23 with at least 70% sustained relief. She noticed that her stiffness and spasms has not been as intense.  A ALEXIS w/MAC provided little to no relief. She also has lumbar pain with radiation to the bilateral lower extremities R>L. Pain is associated with numbness and tingling from the ankle down. Pain is constant in nature. She is s/p a RT L4-S1 TFESI x1 w/ mac on 1/23/24 which provided her with no relief of her symptoms. She notes that there has not been any change in the state of her pain. The option to trial an alternative approach in the form of a caudal BOBBY was discussed and she is interested.

## 2024-02-14 NOTE — ASSESSMENT
[FreeTextEntry1] : This is a 48-year-old female with a chief complaint of bilateral neck pain with pain around right ear. She previously underwent a Cervical TPI on 12/20/23 with at least 70% sustained relief. She wishes to repeat this injection and will be scheduled accordingly. She is s/p a RT L4-S1 TFESI x1 w/ mac on 1/23/24 which provided her with no relief of her symptoms. We will proceed with an alternative approach in the form of a caudal BOBBY with the hopes it provides her better relief of her symptoms. Patient will follow up afterwards for reassessment. All this patients questions were answered and the conversation was understood well.  Patient had a MRI that shows a radicular component along with pain referred into the lower extremity. Patient has trialed rehab (Home exercise, physical therapy or chiropractic care) and medications I will schedule a Caudal 1-3 depending on effectiveness.  The patient has severe anxiety of procedures that necessitates monitored anesthesia care (MAC). The procedure performed will be close to major nerves, arteries, and spinal cord and/or joint structures. Due to the proximity of these structures, we need the patient to be still during the procedure. With the help of MAC, this will be safely achieved and decrease the risk of any complications.  RISK AND BENEFIT PARAGRAPH: Risk, benefits, pros and cons of procedure were explained to the patient using models and diagrams and their questions were answered.  I, Yamila Don, attest that this documentation has been prepared under the direction and in the presence of Provider Vidhi Walker MD.   Thank you for allowing me to assist in the management of this patient.    Best Regards,    Vidhi Walker M.D., FAAPMR   Diplomate, American Board of Physical Medicine and Rehabilitation Diplomate, American Board of Pain Medicine  Diplomate, American Board of Pain Management

## 2024-02-14 NOTE — DATA REVIEWED
[FreeTextEntry1] : EMG/NCV of the upper extremities dated 4/07/23 is normal.   MRI of the cervical spine dated  04/07/2023 finds straightening of lordosis suggest spasm.  Multilevel cervical spondylosis as follows C3-4 disc bulge without central stenosis.  C4-5 central disc herniation with mild central canal stenosis.  C5-6 right paracentral disc herniation with mild central canal stenosis.  C6-7 disc bulge with mild central stenosis.  MRI of the lumbar spine dated 4/6/2023 demonstrates multilevel lumbar spondylosis.  L3-4 disc bulge without focal herniation or central stenosis.  Bulge and facet hypertrophy cause mild bilateral foraminal stenosis.  L4-5 disc bulge with mild central stenosis.  No focal herniation.  Bulge and facet hypertrophy cause mild to moderate bilateral foraminal stenosis.  L5-S1 right paracentral disc herniation with tear causing central and right lateral recess stenosis.  Bulge and facet hypertrophy caused mild to moderate bilateral foraminal stenosis.  S1-2 rudimentary disc suggesting transitional anatomy.  CT of the brain dated 03/17/2023 finds evidence of intracranial hemorrhage.  6 mm calcification is seen along the posterior left parietal parasagittal region, which may simply relate to a falx calcifaction, but small meningioma is also diagnostic considereation. Recommend MRI scan of the without and with contrast for further evaluation.   MRI of the cervical spine dated 7/21/21 finds Mild multilevel degenerative changes and disc bulges not significantly changed since prior examination without significant spinal canal or neural foraminal narrowing.  SOAPP:  low on 4/28/23 Low risk: Patient has combination of a low risk SOAP and no risk factors. UDS would be repeated randomly every quarter.  UDS: No data obtained today.  [place] REGISTRY: Checked.

## 2024-02-22 ENCOUNTER — APPOINTMENT (OUTPATIENT)
Dept: PAIN MANAGEMENT | Facility: CLINIC | Age: 49
End: 2024-02-22
Payer: OTHER MISCELLANEOUS

## 2024-02-22 DIAGNOSIS — M79.12 MYALGIA OF AUXILIARY MUSCLES, HEAD AND NECK: ICD-10-CM

## 2024-02-22 PROCEDURE — 76942 ECHO GUIDE FOR BIOPSY: CPT

## 2024-02-22 PROCEDURE — 20553 NJX 1/MLT TRIGGER POINTS 3/>: CPT

## 2024-02-22 RX ORDER — GABAPENTIN 300 MG/1
300 CAPSULE ORAL 3 TIMES DAILY
Qty: 90 | Refills: 2 | Status: ACTIVE | COMMUNITY
Start: 2023-06-07 | End: 1900-01-01

## 2024-02-22 RX ORDER — NAPROXEN 500 MG/1
500 TABLET ORAL
Qty: 60 | Refills: 2 | Status: ACTIVE | COMMUNITY

## 2024-02-22 NOTE — PROCEDURE
[FreeTextEntry3] : Trigger Point Injection under Ultrasound Guidance   Date:  2024  Patient: Lee Darling  :  1975   Preoperative diagnosis:         Trigger points/ Myalgia                       Postoperative diagnosis:       Same                                                     Procedure:                   Cervical Trigger point injections with ultrasound guidance.                                      Trigger point injections, 3 or more muscles:                                      Ultrasound Guidance:                                         Physician:                  Vidhi Walker MD, FAAPMR                                                                                                Indications for Ultrasound:   Accurate percutaneous needle placement requires image guidance to prevent neuro/vascular injury or intravascular injection, as well as to avoid lung injury.  Medical Necessity:                 Failure of conservative management     Indication: The patient presents with a distribution of pain consistent with trigger point pathology.  On palpation and with ultrasound examination there is focal tenderness and a palpable taut band of muscle with restriction of motion.  Noninvasive treatment modalities such as rest, heat/ice and stretching have been ineffective.  The pain has been present for greater than 3 months.  CONSENT: The possible complications including infection, bleeding, nerve damage, pneumothorax (collapsed lung), hospital admission or failure of the procedure; though unusual, are theoretically possible. The patient was educated about the of the procedure and alternative therapies. All questions were answered and the patient freely gave consent to proceed.     PROCEDURE NOTE:  After obtaining written consent, the patient was placed on the fluoroscopic table in the prone position. Areas of point tenderness in the muscles were identified and prepped with Hibiclens in a sterile fashion.  Using continuous ultrasound guidance for needle localization, multiple trigger points in three or more muscle groups were injected with a 25-gauge 1.5" needle using 5ML 0.50% BUPIVACAINE with 5ML of 2% LIDOCAINE local anesthetic. The direction of needle insertion was kept as parallel to the skin as possible for injections over the thoracic region to avoid directing the needle tip towards the lung. All the needles were removed intact.      Ultrasound findings: No calcifications     Disposition: I have examined the patient and there are no new physical findings since the original presentation.  Sensory and motor function were intact.     Comment: 1st trigger point today, depending on effectiveness and insurance would repeat in 1 week or follow up in office in 1 week. Call if any problems.        This document was electronically signed by:     Vidhi Walker MD, FAAPMR Diplomate, American Board of Physical Medicine and Rehabilitation Diplomate, American Board of Pain Medicine

## 2024-02-28 ENCOUNTER — APPOINTMENT (OUTPATIENT)
Dept: PAIN MANAGEMENT | Facility: CLINIC | Age: 49
End: 2024-02-28
Payer: OTHER MISCELLANEOUS

## 2024-02-28 PROCEDURE — 99075 MEDICAL TESTIMONY: CPT

## 2024-03-19 ENCOUNTER — APPOINTMENT (OUTPATIENT)
Dept: NEUROLOGY | Facility: CLINIC | Age: 49
End: 2024-03-19
Payer: OTHER MISCELLANEOUS

## 2024-03-19 PROCEDURE — 99075 MEDICAL TESTIMONY: CPT

## 2024-03-21 ENCOUNTER — APPOINTMENT (OUTPATIENT)
Dept: PAIN MANAGEMENT | Facility: CLINIC | Age: 49
End: 2024-03-21
Payer: OTHER MISCELLANEOUS

## 2024-03-21 DIAGNOSIS — M54.16 RADICULOPATHY, LUMBAR REGION: ICD-10-CM

## 2024-03-21 DIAGNOSIS — M25.562 PAIN IN LEFT KNEE: ICD-10-CM

## 2024-03-21 DIAGNOSIS — M62.838 OTHER MUSCLE SPASM: ICD-10-CM

## 2024-03-21 DIAGNOSIS — M54.50 LOW BACK PAIN, UNSPECIFIED: ICD-10-CM

## 2024-03-21 DIAGNOSIS — M54.2 CERVICALGIA: ICD-10-CM

## 2024-03-21 PROCEDURE — 99214 OFFICE O/P EST MOD 30 MIN: CPT

## 2024-03-22 PROBLEM — M25.562 LEFT KNEE PAIN, UNSPECIFIED CHRONICITY: Status: ACTIVE | Noted: 2023-11-02

## 2024-03-22 PROBLEM — M54.50 LOW BACK PAIN: Status: ACTIVE | Noted: 2017-04-21

## 2024-03-22 PROBLEM — M54.16 LUMBAR RADICULOPATHY: Status: ACTIVE | Noted: 2024-02-14

## 2024-03-22 PROBLEM — M62.838 CERVICAL PARASPINAL MUSCLE SPASM: Status: ACTIVE | Noted: 2023-12-01

## 2024-03-22 NOTE — WORK
[Are consistent with the injury] : are consistent with the injury [Was the competent medical cause of the injury] : was the competent medical cause of the injury [Consistent with my objective findings] : consistent with my objective findings [Moderate Partial] : moderate partial [Does not reveal pre-existing condition(s) that may affect treatment/prognosis] : does not reveal pre-existing condition(s) that may affect treatment/prognosis [Can return to work with limitations on ______] : can return to work with limitations on [unfilled] [Bending/Twisting] : bending/twisting [Climbing stairs/Ladders] : climbing stairs/ladders [Kneeling] : kneeling [Lifting] : lifting [Operating heavy equipment] : operating heavy equipment [Walking] : walking [Pulling/Pushing] : pulling/pushing [Reaching overhead] : reaching overhead [Sitting] : sitting [Standing] : standing [Patient] : patient [Rx may affect patient's ability to return to work, make patient drowsy, or other issue] : Rx may affect patient's ability to return to work, make patient drowsy, or other issue. [I provided the services listed above] :  I provided the services listed above. [FreeTextEntry1] : fair [Sedentary Work:] : Sedentary Work: Exerting up to 10 pounds of force occasionally and/or a negligible amount of force frequently to lift, carry, push, pull or otherwise move objects, including the human body. Sedentary work involves sitting most of the time, but may involve walking or standing for brief periods of time. Jobs are sedentary if walking and standing are required only occasionally and all other sedentary criteria are met.

## 2024-03-22 NOTE — HISTORY OF PRESENT ILLNESS
[FreeTextEntry1] : ORIGINAL PRESENTATION:  This is a 48-year-old female, presenting as a walk in, here to establish care for bilateral neck pain with pain around the ear. She works as a  and states that on 3/14/23 she experienced pain in her neck and numbness in her hands. She is also experiencing headaches which are quite bothersome. This pain has been ongoing for several years. She experiences pain in the right ear. The neck pain radiates into the bilateral upper extremities, R>L.  There is numbness/tingling in the right arm. She has been attending PT sessions for the past 2 weeks which provides her no relief of her symptoms.  The pain is constant and makes it difficult for her to sleep.    The patient has had this pain for 2 months. The pain started after injury at work Patient describes pain as severe. During the last month the pain has been worse during the day no typical pattern. Pain described as burning, sharp, dull/aching, shooting. Pain is associated with numbness and pins and needles into the upper extremities exercise, relaxation. Pain is not changed with lying down, sitting, standing, walking, coughing/sneezing, bowel movements. Bowel movements have become less frequent.  ACTIVITIES: Patient could walk 7 blocks before the pain starts. The patient often lays down because of pain. Patient uses no assistive walking device at this time. Patient has difficulty going to work, performing household chores, socializing with friends, exercising at this time.  PRIOR PAIN TREATMENTS: Moderate relief with physical therapy.  PRIOR PAIN MEDICATIONS:  Naproxen.  PATIENT PRESENTS FOR FOLLOW UP: She is under our care for bilateral neck pain and lower back pain. Regarding her neck the pain radiates into the bilateral upper extremities, R>L. There is numbness/tingling in the right arm. She is s/p a cervical TPI on 2/22/24 which provided her with no relief of her symptoms. Since it was ineffective, we will not repeat the injection. She continues to have pain in the upper extremities.   She also has lumbar pain with radiation to the bilateral lower extremities R>L. Pain is associated with numbness and tingling from the ankle down. Pain is constant in nature. She is s/p a RT L4-S1 TFESI x1 w/ mac on 1/23/24 which provided her with no relief of her symptoms. She notes that there has not been any change in the state of her pain. We previously submit for a caudal BOBBY injection, but she is still pending authorization through .

## 2024-03-22 NOTE — PHYSICAL EXAM
[Normal Coordination] : normal coordination [Normal DTR UE/LE] : normal DTR UE/LE  [Normal Sensation] : normal sensation [Oriented] : oriented [Normal Mood and Affect] : normal mood and affect [Able to Communicate] : able to communicate [Well Nourished] : well nourished [Well Developed] : well developed [] : no palpable masses [FreeTextEntry8] : Severe degree of palpable trigger points. +Twitch reflex to light and deep palpation. Taught bands present

## 2024-03-22 NOTE — ASSESSMENT
[FreeTextEntry1] : This is a 48-year-old female with a chief complaint of bilateral neck pain with pain around right ear. She is s/p a cervical TPI on 2/22/24 which provided her with no relief of her symptoms. Regarding her lower back pain, she is s/p a RT L4-S1 TFESI x1 w/ mac on 1/23/24 which provided her with no relief of her symptoms. She is still pending approval for a caudal BOBBY and acupuncture. She will follow up in 4 weeks for reassessment. All this patients questions were answered and the conversation was understood well.  I, Yamila Don, attest that this documentation has been prepared under the direction and in the presence of Provider Vidhi Walker MD.   Thank you for allowing me to assist in the management of this patient.    Best Regards,    Vidhi Walker M.D., FAAPMR   Diplomate, American Board of Physical Medicine and Rehabilitation Diplomate, American Board of Pain Medicine  Diplomate, American Board of Pain Management

## 2024-04-02 ENCOUNTER — APPOINTMENT (OUTPATIENT)
Dept: NEUROLOGY | Facility: CLINIC | Age: 49
End: 2024-04-02
Payer: OTHER MISCELLANEOUS

## 2024-04-02 VITALS
DIASTOLIC BLOOD PRESSURE: 84 MMHG | BODY MASS INDEX: 34.48 KG/M2 | WEIGHT: 149 LBS | HEART RATE: 100 BPM | SYSTOLIC BLOOD PRESSURE: 140 MMHG | HEIGHT: 55 IN

## 2024-04-02 DIAGNOSIS — G56.21 LESION OF ULNAR NERVE, RIGHT UPPER LIMB: ICD-10-CM

## 2024-04-02 DIAGNOSIS — G56.22 LESION OF ULNAR NERVE, LEFT UPPER LIMB: ICD-10-CM

## 2024-04-02 DIAGNOSIS — R51.9 HEADACHE, UNSPECIFIED: ICD-10-CM

## 2024-04-02 PROCEDURE — G2211 COMPLEX E/M VISIT ADD ON: CPT | Mod: NC,1L

## 2024-04-02 PROCEDURE — 99215 OFFICE O/P EST HI 40 MIN: CPT

## 2024-04-02 RX ORDER — CYCLOBENZAPRINE HYDROCHLORIDE 10 MG/1
10 TABLET, FILM COATED ORAL TWICE DAILY
Qty: 60 | Refills: 1 | Status: ACTIVE | COMMUNITY
Start: 2024-04-02 | End: 1900-01-01

## 2024-04-02 NOTE — HISTORY OF PRESENT ILLNESS
[FreeTextEntry1] : Ms. TIMMY ZAMORA returns to the office for follow-up and her prior history and physical have been reviewed and she reports no change since last visit.  she has been seeing us for cervical radiculopathy, cervicogenic headache and bilateral carpal/cubital tunnel syndrome under a Worker's Comp. case as part of chronic occupational injury and no specific accident.  Her symptoms started March 14, 2023.  Worker's Comp. has consistently denied carpal tunnel syndrome as part of the case and has denying occupational therapy multiple times.  A deposition was done last month, the result of which is not known and patient has not contacted her  recently.  She does have a hearing coming up soon.  In terms of her cervical radiculopathy she has done physical therapy as well as seeing pain management for multiple injections without any relief.  MRI of the cervical spine showed mild multilevel degenerative disc disease and mild spinal/foraminal stenosis but no spinal cord impingement.  EMG/NCS of the upper extremities were normal.  Patient has tried gabapentin which made her very drowsy so she discontinued.  The only thing that works for her is cyclobenzaprine.  She reports that she is in constant pain and would like to see the neurosurgeon for consultation and if she needs to use her own insurance for the carpal/cubital tunnel syndrome, she would like to do that.  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

## 2024-04-02 NOTE — ASSESSMENT
[FreeTextEntry1] : Cervical radiculopathy - Since patient has not benefited from conservative treatment including physical therapy and pain management, I would like to send her for a neurosurgical consultation.  I went over the potential complications/sequela of spinal surgery with her and she reported understanding. -I reviewed the past MRI of the cervical spine as well as EMG/NCS of the upper extremities with her, and I told her that her symptoms are out of proportion to the findings on the MRI as well as EMG/NCS.  The fact that she did not respond to any medical treatment was somewhat suspicious for possible underlying rheumatological condition.  Even though she had seen the rheumatologist in the past, I recommend getting a second opinion especially if she is really considering surgery for her pain.  Bilateral carpal/cubital tunnel syndrome - Even though EMG/NCS of the upper extremities was normal, clinically she does have the sign of the symptoms. - Patient should talk to her  first, and if they are still not part of Worker's Comp., then she should use her own insurance to see hand surgery  her symptoms are causally related to the work related accident she sustained, and she is considered to have temporary total disability and is not recommended to go back to work at this time.  Total clinician time spent  is  41 minutes including preparing to see the patient, obtaining and/or reviewing and confirming history, performing a medically necessary and appropriate examination, counseling and educating the patient and/or family, documenting clinical information in the HER and communicating and/or referring to other healthcare professionals.   Jaguar Roldan DO Board Certified, Neurology

## 2024-04-18 ENCOUNTER — APPOINTMENT (OUTPATIENT)
Dept: PAIN MANAGEMENT | Facility: CLINIC | Age: 49
End: 2024-04-18

## 2024-06-04 ENCOUNTER — APPOINTMENT (OUTPATIENT)
Dept: NEUROLOGY | Facility: CLINIC | Age: 49
End: 2024-06-04

## 2024-06-12 ENCOUNTER — APPOINTMENT (OUTPATIENT)
Dept: NEUROLOGY | Facility: CLINIC | Age: 49
End: 2024-06-12
Payer: OTHER MISCELLANEOUS

## 2024-06-12 VITALS — BODY MASS INDEX: 32.05 KG/M2 | HEIGHT: 59 IN | WEIGHT: 159 LBS

## 2024-06-12 DIAGNOSIS — M54.12 RADICULOPATHY, CERVICAL REGION: ICD-10-CM

## 2024-06-12 DIAGNOSIS — G44.209 TENSION-TYPE HEADACHE, UNSPECIFIED, NOT INTRACTABLE: ICD-10-CM

## 2024-06-12 DIAGNOSIS — M50.20 OTHER CERVICAL DISC DISPLACEMENT, UNSPECIFIED CERVICAL REGION: ICD-10-CM

## 2024-06-12 PROCEDURE — 99214 OFFICE O/P EST MOD 30 MIN: CPT | Mod: ACP

## 2024-06-12 NOTE — HISTORY OF PRESENT ILLNESS
[FreeTextEntry1] : Previous Encounter 4.2.24 : Ms. TIMMY DARLING returns to the office for follow-up and her prior history and physical have been reviewed and she reports no change since last visit. she has been seeing us for cervical radiculopathy, cervicogenic headache and bilateral carpal/cubital tunnel syndrome under a Worker's Comp. case as part of chronic occupational injury and no specific accident. Her symptoms started March 14, 2023. Worker's Comp. has consistently denied carpal tunnel syndrome as part of the case and has denying occupational therapy multiple times. A deposition was done last month, the result of which is not known and patient has not contacted her  recently. She does have a hearing coming up soon. In terms of her cervical radiculopathy she has done physical therapy as well as seeing pain management for multiple injections without any relief. MRI of the cervical spine showed mild multilevel degenerative disc disease and mild spinal/foraminal stenosis but no spinal cord impingement. EMG/NCS of the upper extremities were normal. Patient has tried gabapentin which made her very drowsy so she discontinued. The only thing that works for her is cyclobenzaprine.   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   Diagnostic Testing :    EMG/NCV of the upper extremities  4/07/23 : Normal.   MRI cervical spine 04/07/2023 : straightening of lordosis suggest spasm. Multilevel cervical spondylosis as follows C3-4 disc bulge without central stenosis. C4-5 central disc herniation with mild central canal stenosis. C5-6 right paracentral disc herniation with mild central canal stenosis. C6-7 disc bulge with mild central stenosis.   MRI lumbar spine 4/6/2023 : multilevel lumbar spondylosis. L3-4 disc bulge without focal herniation or central stenosis. Bulge and facet hypertrophy cause mild bilateral foraminal stenosis. L4-5 disc bulge with mild central stenosis. No focal herniation. Bulge and facet hypertrophy cause mild to moderate bilateral foraminal stenosis. L5-S1 right paracentral disc herniation with tear causing central and right lateral recess stenosis. Bulge and facet hypertrophy caused mild to moderate bilateral foraminal stenosis. S1-2 rudimentary disc suggesting transitional anatomy.  CTH 03/17/2023: Evidence of intracranial hemorrhage. 6 mm calcification is seen along the posterior left parietal parasagittal region, which may simply relate to a falx calcification, but small meningioma is also diagnostic consideration. Recommend MRI scan of the without and with contrast for further evaluation.   CTH 7.25.23 : In comparison with prior CT Brain , no intracranial hemorrhage, stable examination    EMG Uppers 4.17.24 : Abnormal with evidence of b/l C6-7 cervical radiculopathy.   Today : Today I had the pleasure of seeing Ms. DARLING in our office for follow up.  Their past medical history and imaging have been reviewed.   Ms. Darling has been seeing us for cervical radiculopathy and cervicogenic headaches as part of a Worker's Comp case due to chronic occupational injury and no specific accident. Her symptoms started March 14, 2023.  She has been seen by pain management for bilateral neck pain and lower back pain and is s/p a cervical TPI on 2/22/24 which provided her with no relief of her symptoms. Since it was ineffective, they decided not to repeat the injection but she continues to have pain in the upper extremities. She saw Neurosurgery in the past and was told no surgical intervention was indicated. Today she presents with EMG reports of the upper and lower extremities done at her PCP office 4.17.24  that revealed evidence of bilateral C6-7 radiculopathy which is new compared to her EMG of the UE done 4.7.23 which was normal. Patient is no longer participating in physical therapy and is not prescribed medication by our office but her symptoms remains active. Per Dr. Roldan on 4.2.24 she is still considered temporarily disabled and remains out of work. I discussed with her today that she should f.u with pain management and Neurosurgery since we are not actively treating her or providing intervention.

## 2024-06-12 NOTE — ASSESSMENT
[FreeTextEntry1] : 48 year old female with cervical radiculopathy and cervicogenic headaches  part of a Worker's Comp case (chronic occupational injury w/ no specific accident.) Her symptoms started March 14, 2023. Conservative management has been ineffective thus far and intervention with pain management has not provided improvement. Her symptoms remains active and she remains out of work.  Today she presents with EMG reports of the upper and lower extremities done at her PCP office 4.17.24  that revealed evidence of bilateral C6-7 radiculopathy which is new compared to her EMG of the UE done 4.7.23 which was normal. Patient is no longer participating in physical therapy and is not prescribed medication by our office but her symptoms remains active. Per Dr. Roldan on 4.2.24 she is still considered temporarily disabled and remains out of work. I discussed with her today that she should f.u with pain management and Neurosurgery since we are not actively treating her or providing intervention. She will RTO for re-assessment in 2-3 months.   Supervising Physician : Jaguar Roldan, DO

## 2024-07-01 ENCOUNTER — APPOINTMENT (OUTPATIENT)
Dept: ORTHOPEDIC SURGERY | Facility: CLINIC | Age: 49
End: 2024-07-01
Payer: OTHER MISCELLANEOUS

## 2024-07-01 VITALS — BODY MASS INDEX: 31.85 KG/M2 | HEIGHT: 59 IN | WEIGHT: 158 LBS

## 2024-07-01 DIAGNOSIS — G56.03 CARPAL TUNNEL SYNDROM,BILATERAL UPPER LIMBS: ICD-10-CM

## 2024-07-01 PROCEDURE — 99202 OFFICE O/P NEW SF 15 MIN: CPT

## 2024-08-05 ENCOUNTER — APPOINTMENT (OUTPATIENT)
Dept: ORTHOPEDIC SURGERY | Facility: CLINIC | Age: 49
End: 2024-08-05

## 2024-08-05 PROCEDURE — 99213 OFFICE O/P EST LOW 20 MIN: CPT

## 2024-08-05 NOTE — HISTORY OF PRESENT ILLNESS
[de-identified] : 48-year-old female with pain discomfort in her right hand.  She has had 2 EMGs that did not show carpal tunnel syndrome.  She still complaining about numbness and tingling to fingers.  And pain discomfort in her hand.  She currently is not working.  Because of her hand only has a mild partial temporary disability.

## 2024-08-05 NOTE — ASSESSMENT
[FreeTextEntry1] : Patient has pain discomfort in his right hand.  This certainly some signs and symptoms associated with carpal tunnel syndrome.  But there is no diagnostic testing that is positive for this.  At this time she also reports that carpal tunnel syndrome was not excepted diagnosis with her claim.  She will be kept under observation.  No further treatments from her hand at this time are warranted.  We did discuss the possibility of receiving a cortisone injection into the carpal tunnel to see if it alleviated some of the symptoms

## 2024-08-05 NOTE — PHYSICAL EXAM
[de-identified] : Patient does have positive Tinel's and median nerve compression test.  Patient has good range of motion to the fingers.  Good thenar strength no thenar atrophy.  There is no erythema ecchymoses or abrasions.

## 2024-08-13 ENCOUNTER — APPOINTMENT (OUTPATIENT)
Dept: NEUROLOGY | Facility: CLINIC | Age: 49
End: 2024-08-13
Payer: OTHER MISCELLANEOUS

## 2024-08-13 VITALS
WEIGHT: 160 LBS | SYSTOLIC BLOOD PRESSURE: 138 MMHG | BODY MASS INDEX: 32.25 KG/M2 | DIASTOLIC BLOOD PRESSURE: 85 MMHG | HEIGHT: 59 IN | HEART RATE: 89 BPM

## 2024-08-13 DIAGNOSIS — G56.03 CARPAL TUNNEL SYNDROM,BILATERAL UPPER LIMBS: ICD-10-CM

## 2024-08-13 DIAGNOSIS — G56.22 LESION OF ULNAR NERVE, LEFT UPPER LIMB: ICD-10-CM

## 2024-08-13 DIAGNOSIS — M54.12 RADICULOPATHY, CERVICAL REGION: ICD-10-CM

## 2024-08-13 PROCEDURE — 99213 OFFICE O/P EST LOW 20 MIN: CPT

## 2024-08-13 RX ORDER — GABAPENTIN 100 MG/1
100 CAPSULE ORAL 3 TIMES DAILY
Qty: 90 | Refills: 2 | Status: ACTIVE | COMMUNITY
Start: 2024-08-13 | End: 1900-01-01

## 2024-08-13 NOTE — HISTORY OF PRESENT ILLNESS
[FreeTextEntry1] : TODAY : Ms. TIMMY ZAMORA returns to the office for follow-up and her prior history and physical have been reviewed and she reports no change since last visit.  she has been seeing us for cervical radiculopathy, cervicogenic headache, bilateral carpal tunnel/cubital tunnel syndrome as part of a Worker's Comp. case which was dated March 14, 2023.  He was not a specific incident that led to her symptoms, but more of occupational complication of working at [employer].  She has had all the necessary scans and nerve studies, and had done physical therapy as well as seeing pain management, and orthopedics.  However Worker's Comp. recently disallowed all the body parts except the right hand.  Patient currently is working on long-term disability with [employer], and she reports that it was [employer] that urged her to start a Worker's Comp. case.  She does not have a job to go back to since [employer] fired  all the employees.  The only medication she found helpful was cyclobenzaprine and gabapentin 100 mg.  She was given 300 mg but it was too strong    Diagnostic Testing :    EMG/NCV of the upper extremities  4/07/23 : Normal.   MRI cervical spine 04/07/2023 : straightening of lordosis suggest spasm. Multilevel cervical spondylosis as follows C3-4 disc bulge without central stenosis. C4-5 central disc herniation with mild central canal stenosis. C5-6 right paracentral disc herniation with mild central canal stenosis. C6-7 disc bulge with mild central stenosis.   MRI lumbar spine 4/6/2023 : multilevel lumbar spondylosis. L3-4 disc bulge without focal herniation or central stenosis. Bulge and facet hypertrophy cause mild bilateral foraminal stenosis. L4-5 disc bulge with mild central stenosis. No focal herniation. Bulge and facet hypertrophy cause mild to moderate bilateral foraminal stenosis. L5-S1 right paracentral disc herniation with tear causing central and right lateral recess stenosis. Bulge and facet hypertrophy caused mild to moderate bilateral foraminal stenosis. S1-2 rudimentary disc suggesting transitional anatomy.  CTH 03/17/2023: Evidence of intracranial hemorrhage. 6 mm calcification is seen along the posterior left parietal parasagittal region, which may simply relate to a falx calcification, but small meningioma is also diagnostic consideration. Recommend MRI scan of the without and with contrast for further evaluation.   CTH 7.25.23 : In comparison with prior CT Brain , no intracranial hemorrhage, stable examination    EMG Uppers 4.17.24 : Abnormal with evidence of b/l C6-7 cervical radiculopathy.

## 2024-08-13 NOTE — ASSESSMENT
[FreeTextEntry1] : Since right hand is the only body part that is considered injured in her case, she should follow-up with orthopedics.  She can follow-up with us under her own insurance. -Gabapentin and cyclobenzaprine refilled

## 2024-08-13 NOTE — HISTORY OF PRESENT ILLNESS
[FreeTextEntry1] : TODAY : Ms. TIMMY ZAMORA returns to the office for follow-up and her prior history and physical have been reviewed and she reports no change since last visit.  she has been seeing us for cervical radiculopathy, cervicogenic headache, bilateral carpal tunnel/cubital tunnel syndrome as part of a Worker's Comp. case which was dated March 14, 2023.  He was not a specific incident that led to her symptoms, but more of occupational complication of working at [employer].  She has had all the necessary scans and nerve studies, and had done physical therapy as well as seeing pain management, and orthopedics.  However Worker's Comp. recently disallowed all the body parts except the right hand.  Patient currently is working on long-term disability with [employer], and she reports that it was [employer] that urged her to start a Worker's Comp. case.  She does not have a job to go back to since [employer] fired  all the employees.  The only medication she found helpful was cyclobenzaprine and gabapentin 100 mg.  She was given 300 mg but it was too strong    Diagnostic Testing :    EMG/NCV of the upper extremities  4/07/23 : Normal.   MRI cervical spine 04/07/2023 : straightening of lordosis suggest spasm. Multilevel cervical spondylosis as follows C3-4 disc bulge without central stenosis. C4-5 central disc herniation with mild central canal stenosis. C5-6 right paracentral disc herniation with mild central canal stenosis. C6-7 disc bulge with mild central stenosis.   MRI lumbar spine 4/6/2023 : multilevel lumbar spondylosis. L3-4 disc bulge without focal herniation or central stenosis. Bulge and facet hypertrophy cause mild bilateral foraminal stenosis. L4-5 disc bulge with mild central stenosis. No focal herniation. Bulge and facet hypertrophy cause mild to moderate bilateral foraminal stenosis. L5-S1 right paracentral disc herniation with tear causing central and right lateral recess stenosis. Bulge and facet hypertrophy caused mild to moderate bilateral foraminal stenosis. S1-2 rudimentary disc suggesting transitional anatomy.  CTH 03/17/2023: Evidence of intracranial hemorrhage. 6 mm calcification is seen along the posterior left parietal parasagittal region, which may simply relate to a falx calcification, but small meningioma is also diagnostic consideration. Recommend MRI scan of the without and with contrast for further evaluation.   CTH 7.25.23 : In comparison with prior CT Brain , no intracranial hemorrhage, stable examination    EMG Uppers 4.17.24 : Abnormal with evidence of b/l C6-7 cervical radiculopathy.

## 2024-10-28 ENCOUNTER — APPOINTMENT (OUTPATIENT)
Dept: ORTHOPEDIC SURGERY | Facility: CLINIC | Age: 49
End: 2024-10-28
Payer: COMMERCIAL

## 2024-10-28 ENCOUNTER — NON-APPOINTMENT (OUTPATIENT)
Age: 49
End: 2024-10-28

## 2024-10-28 VITALS — WEIGHT: 159 LBS | BODY MASS INDEX: 32.05 KG/M2 | HEIGHT: 59 IN

## 2024-10-28 DIAGNOSIS — G56.21 LESION OF ULNAR NERVE, RIGHT UPPER LIMB: ICD-10-CM

## 2024-10-28 DIAGNOSIS — G56.03 CARPAL TUNNEL SYNDROM,BILATERAL UPPER LIMBS: ICD-10-CM

## 2024-10-28 DIAGNOSIS — G56.22 LESION OF ULNAR NERVE, LEFT UPPER LIMB: ICD-10-CM

## 2024-10-28 PROCEDURE — 99213 OFFICE O/P EST LOW 20 MIN: CPT

## 2024-11-15 ENCOUNTER — APPOINTMENT (OUTPATIENT)
Dept: NEUROLOGY | Facility: CLINIC | Age: 49
End: 2024-11-15
Payer: COMMERCIAL

## 2024-11-15 PROCEDURE — 95911 NRV CNDJ TEST 9-10 STUDIES: CPT

## 2024-11-15 PROCEDURE — 95886 MUSC TEST DONE W/N TEST COMP: CPT

## 2024-11-29 ENCOUNTER — APPOINTMENT (OUTPATIENT)
Dept: ORTHOPEDIC SURGERY | Facility: CLINIC | Age: 49
End: 2024-11-29
Payer: COMMERCIAL

## 2024-11-29 DIAGNOSIS — G56.01 CARPAL TUNNEL SYNDROME, RIGHT UPPER LIMB: ICD-10-CM

## 2024-11-29 PROCEDURE — 99214 OFFICE O/P EST MOD 30 MIN: CPT

## 2024-12-06 ENCOUNTER — APPOINTMENT (OUTPATIENT)
Dept: ORTHOPEDIC SURGERY | Facility: CLINIC | Age: 49
End: 2024-12-06
Payer: COMMERCIAL

## 2024-12-06 DIAGNOSIS — M54.41 LUMBAGO WITH SCIATICA, RIGHT SIDE: ICD-10-CM

## 2024-12-06 DIAGNOSIS — G89.29 LUMBAGO WITH SCIATICA, RIGHT SIDE: ICD-10-CM

## 2024-12-06 DIAGNOSIS — M54.12 RADICULOPATHY, CERVICAL REGION: ICD-10-CM

## 2024-12-06 PROCEDURE — 99204 OFFICE O/P NEW MOD 45 MIN: CPT

## 2024-12-13 ENCOUNTER — APPOINTMENT (OUTPATIENT)
Dept: MRI IMAGING | Facility: CLINIC | Age: 49
End: 2024-12-13
Payer: COMMERCIAL

## 2024-12-13 PROCEDURE — 72148 MRI LUMBAR SPINE W/O DYE: CPT

## 2024-12-13 PROCEDURE — 72141 MRI NECK SPINE W/O DYE: CPT

## 2024-12-18 ENCOUNTER — APPOINTMENT (OUTPATIENT)
Facility: CLINIC | Age: 49
End: 2024-12-18
Payer: COMMERCIAL

## 2024-12-18 DIAGNOSIS — M54.12 RADICULOPATHY, CERVICAL REGION: ICD-10-CM

## 2024-12-18 PROCEDURE — 72050 X-RAY EXAM NECK SPINE 4/5VWS: CPT

## 2024-12-18 PROCEDURE — 99214 OFFICE O/P EST MOD 30 MIN: CPT

## 2025-01-24 ENCOUNTER — EMERGENCY (EMERGENCY)
Facility: HOSPITAL | Age: 50
LOS: 0 days | Discharge: HOME | End: 2025-01-24
Attending: EMERGENCY MEDICINE
Payer: COMMERCIAL

## 2025-01-24 VITALS
SYSTOLIC BLOOD PRESSURE: 136 MMHG | TEMPERATURE: 99 F | WEIGHT: 158.29 LBS | DIASTOLIC BLOOD PRESSURE: 83 MMHG | OXYGEN SATURATION: 100 % | RESPIRATION RATE: 20 BRPM | HEART RATE: 95 BPM | HEIGHT: 59 IN

## 2025-01-24 DIAGNOSIS — Z98.890 OTHER SPECIFIED POSTPROCEDURAL STATES: Chronic | ICD-10-CM

## 2025-01-24 DIAGNOSIS — Z98.891 HISTORY OF UTERINE SCAR FROM PREVIOUS SURGERY: Chronic | ICD-10-CM

## 2025-01-24 PROCEDURE — 96372 THER/PROPH/DIAG INJ SC/IM: CPT

## 2025-01-24 PROCEDURE — 73080 X-RAY EXAM OF ELBOW: CPT | Mod: RT

## 2025-01-24 PROCEDURE — 73090 X-RAY EXAM OF FOREARM: CPT | Mod: RT

## 2025-01-24 PROCEDURE — 73090 X-RAY EXAM OF FOREARM: CPT | Mod: 26,RT

## 2025-01-24 PROCEDURE — 99285 EMERGENCY DEPT VISIT HI MDM: CPT

## 2025-01-24 PROCEDURE — 99284 EMERGENCY DEPT VISIT MOD MDM: CPT | Mod: 25

## 2025-01-24 PROCEDURE — 93971 EXTREMITY STUDY: CPT | Mod: RT

## 2025-01-24 PROCEDURE — 73080 X-RAY EXAM OF ELBOW: CPT | Mod: 26,RT

## 2025-01-24 PROCEDURE — 93971 EXTREMITY STUDY: CPT | Mod: 26,RT

## 2025-01-24 RX ORDER — DEXAMETHASONE SODIUM PHOSPHATE 4 MG/ML
10 VIAL (ML) INJECTION ONCE
Refills: 0 | Status: COMPLETED | OUTPATIENT
Start: 2025-01-24 | End: 2025-01-24

## 2025-01-24 RX ORDER — KETOROLAC TROMETHAMINE 30 MG/ML
30 INJECTION INTRAMUSCULAR; INTRAVENOUS ONCE
Refills: 0 | Status: DISCONTINUED | OUTPATIENT
Start: 2025-01-24 | End: 2025-01-24

## 2025-01-24 RX ADMIN — Medication 10 MILLIGRAM(S): at 16:55

## 2025-01-24 RX ADMIN — KETOROLAC TROMETHAMINE 30 MILLIGRAM(S): 30 INJECTION INTRAMUSCULAR; INTRAVENOUS at 16:55

## 2025-01-24 NOTE — ED PROVIDER NOTE - OBJECTIVE STATEMENT
49 year old female, past medical history cervical radiculopathy, carpal tunnel, right hand dominant, who presents with rue pain/swelling. patient with progressively worsening pain with associated swelling to right forearm. patient with chronic neck and wrist pain to rue, currently on gabapentin and robaxin daily. denies f/c, chest pain, shortness of breath, skin changes, numbness/weakness. no falls.

## 2025-01-24 NOTE — ED PROVIDER NOTE - CARE PROVIDER_API CALL
Francine Sterling  Neurosurgery  [address] [place] [address], [address]  [place] [address]  Phone: [phone]  Fax: [phone]  Follow Up Time: 4-6 Days    Kirt Hagan  Orthopaedic Surgery  [facility], [id number] [place] [address]  Phone: [phone]  Fax: [phone]  Follow Up Time: 4-6 Days

## 2025-01-24 NOTE — ED PROVIDER NOTE - NSFOLLOWUPINSTRUCTIONS_ED_ALL_ED_FT
Please follow up with your primary care doctor, orthopedics and neurosurgery in 1-3 days  Please be aware of any new or worsening signs or symptoms that should prompt your return to the ER.    Arm Pain    WHAT YOU NEED TO KNOW:    Your arm pain may be caused by a number of conditions. Examples include arthritis, nerve problems, or an awkward position while you sleep. X-rays did not show a broken bone in your arm or wrist. Arm pain may be a sign of a serious condition that needs immediate care, such as a heart attack.    DISCHARGE INSTRUCTIONS:    Call 911 for any of the following: You have any of the following signs of a heart attack:     Squeezing, pressure, or pain in your chest that lasts longer than 5 minutes or returns      Discomfort or pain in your back, neck, jaw, stomach, or arm       Trouble breathing or a fast, fluttery heartbeat      Nausea or vomiting      Lightheadedness or a sudden cold sweat, especially with chest pain or trouble breathing    Return to the emergency department if:     You have severe pain, or pain that spreads from your arm to other areas.      You have swelling, tingling, or numbness in your hand or fingers, or the skin turns blue.      You cannot move your arm.    Contact your healthcare provider if:     You have questions or concerns about your condition or care.        Medicines: You may need any of the following:     Prescription pain medicine may be given. Ask how to take this medicine safely.      NSAIDs, such as ibuprofen, help decrease swelling, pain, and fever. This medicine is available with or without a doctor's order. NSAIDs can cause stomach bleeding or kidney problems in certain people. If you take blood thinner medicine, always ask your healthcare provider if NSAIDs are safe for you. Always read the medicine label and follow directions.      Take your medicine as directed. Contact your healthcare provider if you think your medicine is not helping or if you have side effects. Tell him or her if you are allergic to any medicine. Keep a list of the medicines, vitamins, and herbs you take. Include the amounts, and when and why you take them. Bring the list or the pill bottles to follow-up visits. Carry your medicine list with you in case of an emergency.    Self-care:     Rest your arm as directed. A sling may be used to keep your arm from moving while it heals.      Apply ice as directed. Ice helps decrease pain and swelling. Ice may also help prevent tissue damage. Use an ice pack, or put crushed ice in a plastic bag. Cover it with a towel. Apply it to your arm for 20 minutes every few hours, or as directed. Ask how many times to apply ice each day, and for how many days.      Elevate your arm above the level of your heart as often as you can. This will help decrease swelling and pain. Prop your arm on pillows or blankets to keep the area elevated comfortably.      Adjust your position if you work in front of a computer. You may need arm or wrist supports or change the height of your chair.       Keep a pain record. Write down when your pain happens and how severe it is. Include any other symptoms you have with your pain. A record will help you keep track of pain cycles. Bring the record with you to your follow-up visits. It may also help your healthcare provider find out what is causing your pain.    Follow up with your healthcare provider as directed: You may need physical therapy. You may need to see an orthopedic specialist. Write down your questions so you remember to ask them during your visits.       © Copyright [employer] 2019 All illustrations and images included in CareNotes are the copyrighted property of [employer].A[employer]., Inc. or [email or web address]

## 2025-01-24 NOTE — ED PROVIDER NOTE - PHYSICAL EXAMINATION
CONSTITUTIONAL: Well-developed; well-nourished; in no acute distress, nontoxic appearing  SKIN: skin exam is warm and dry  HEAD: Normocephalic; atraumatic  NECK: ROM intact.  CARD: S1, S2 normal, no murmur  RESP: No wheezes, rales or rhonchi. Good air movement bilaterally  EXT: RUE: +swelling to right forearm, pain exacerbated with extension of right elbow, no bony tenderness or deformity, no warmth or erythema, pulses 2+. +TTP overlying R trapezius   NEURO: awake, alert, following commands, oriented, grossly unremarkable. No Focal deficits. GCS 15.   PSYCH: Cooperative, appropriate.

## 2025-01-24 NOTE — ED PROVIDER NOTE - CLINICAL SUMMARY MEDICAL DECISION MAKING FREE TEXT BOX
49-year-old female with h/o anxiety, migraines, chronic neck pain, RUE carpal tunnel, in ER with complaint of pain to RUE.  Patient has been having pain to R arm for the past 3 years, feels pain has gotten worse recently. Pain/swelling to forearm.  No redness.  No F/C.  No recent trauma.  No paresthesias or motor weakness.  No CP/SOB.  No abdominal pain.  Patient following with neurosurgery and also hand surgery, Dr. Haagn, is scheduled to have carpal tunnel surgery next week.  PE - nad, nc/at, eomi, perrl, op - clear, mmm, no C-spine tenderness, cta b/l, no w/r/r, rrr, abd- soft, nt/nd, nabs, from x 4, RUE: mild swelling to forearm with soft compartments, no erythema/warmth, 2+ radial pulse, cap refill <2 secs, sensation intact throughout no LE swelling/tenderness, A&O x 3, cn 2-12 intact, no focal motor/sensory deficits.     -R elbow/forearm x-rays negative for fracture.  Prelim RUE duplex negative for DVT.  Patient feels better after pain meds in ER.  To DC home, patient had already has follow-up scheduled with her hand surgeon.  Told to return to ER if she feels worse, or for any new/concerning symptoms.  Patient understands and agrees with plan.

## 2025-01-24 NOTE — ED PROVIDER NOTE - CARE PROVIDERS DIRECT ADDRESSES
,[email or web address]

## 2025-01-24 NOTE — ED PROVIDER NOTE - PROVIDER TOKENS
PROVIDER:[TOKEN:[[id number]],FOLLOWUP:[4-6 Days]],PROVIDER:[TOKEN:[[id number]],FOLLOWUP:[4-6 Days]]

## 2025-01-24 NOTE — ED PROVIDER NOTE - PATIENT PORTAL LINK FT
You can access the FollowMyHealth Patient Portal offered by [facility] by registering at the following website: [email or web address]. By joining [facility]’s FollowMyHealth portal, you will also be able to view your health information using other applications (apps) compatible with our system.

## 2025-01-28 ENCOUNTER — OUTPATIENT (OUTPATIENT)
Dept: OUTPATIENT SERVICES | Facility: HOSPITAL | Age: 50
LOS: 1 days | Discharge: HOME | End: 2025-01-28
Payer: COMMERCIAL

## 2025-01-28 ENCOUNTER — APPOINTMENT (OUTPATIENT)
Dept: ORTHOPEDIC SURGERY | Facility: HOSPITAL | Age: 50
End: 2025-01-28

## 2025-01-28 ENCOUNTER — TRANSCRIPTION ENCOUNTER (OUTPATIENT)
Age: 50
End: 2025-01-28

## 2025-01-28 VITALS
HEART RATE: 90 BPM | WEIGHT: 158.07 LBS | RESPIRATION RATE: 18 BRPM | TEMPERATURE: 98 F | DIASTOLIC BLOOD PRESSURE: 80 MMHG | OXYGEN SATURATION: 100 % | HEIGHT: 59 IN | SYSTOLIC BLOOD PRESSURE: 129 MMHG

## 2025-01-28 VITALS — SYSTOLIC BLOOD PRESSURE: 130 MMHG | HEART RATE: 84 BPM | RESPIRATION RATE: 17 BRPM | DIASTOLIC BLOOD PRESSURE: 84 MMHG

## 2025-01-28 DIAGNOSIS — Z98.890 OTHER SPECIFIED POSTPROCEDURAL STATES: Chronic | ICD-10-CM

## 2025-01-28 DIAGNOSIS — Z98.891 HISTORY OF UTERINE SCAR FROM PREVIOUS SURGERY: Chronic | ICD-10-CM

## 2025-01-28 PROCEDURE — 64721 CARPAL TUNNEL SURGERY: CPT | Mod: RT

## 2025-01-28 RX ORDER — IBUPROFEN 600 MG/1
1 TABLET, FILM COATED ORAL
Qty: 20 | Refills: 0
Start: 2025-01-28

## 2025-01-28 NOTE — ASU DISCHARGE PLAN (ADULT/PEDIATRIC) - NS MD DC FALL RISK RISK
For information on Fall & Injury Prevention, visit: [email or web address] OR  [email or web address] OR  https://www.cdc.gov/steadi/patient.html

## 2025-01-28 NOTE — ASU PATIENT PROFILE, ADULT - FALL HARM RISK - HARM RISK INTERVENTIONS

## 2025-01-28 NOTE — ASU PREOP CHECKLIST - TEMPERATURE IN FAHRENHEIT (DEGREES F)
PHYSICIAN ORDERS      DATE & TIME ISSUED: 2022 10:34 AM  PATIENT NAME: Valery Talbot   : 1966     [facility] MR# [if applicable]: [id number]     DIAGNOSIS:  Awaiting organ transplant  ICD-10 CODE: Z76.82   As part of this pt's pre-kidney/pancreas transplant evaluation, the transplant team is requesting the following tests be completed:  1.  Ultrasound: Aorta/IVC/iliac duplex complete (please push image and fax report)    2.  PFTs    3.  Follow up Low Dose Chest CT     Any questions please call: MARLENA Tabor [phone]  Results may be faxed to:    [phone]    .       97.6

## 2025-01-28 NOTE — ASU PATIENT PROFILE, ADULT - NS PRO PT RIGHT SUPPORT PERSON
Please send letter to patient and place in recall for colonoscopy due in 5 years with Dr. Shanda Martinez.    See TE for details on endoscopic revision referral.       7/14/2021  [id number] [phone]    Dear Erika Diaz,       Here are th
same name as above

## 2025-01-28 NOTE — ASU PATIENT PROFILE, ADULT - NSICDXPASTSURGICALHX_GEN_ALL_CORE_FT
PAST SURGICAL HISTORY:  S/P      S/P D&C (status post dilation and curettage) miscarriage    S/P inguinal hernia repair right

## 2025-01-28 NOTE — ASU DISCHARGE PLAN (ADULT/PEDIATRIC) - FINANCIAL ASSISTANCE
[facility] provides services at a reduced cost to those who are determined to be eligible through [facility]’s financial assistance program. Information regarding [facility]’s financial assistance program can be found by going to [email or web address] or by calling [phone].

## 2025-01-30 DIAGNOSIS — G56.01 CARPAL TUNNEL SYNDROME, RIGHT UPPER LIMB: ICD-10-CM

## 2025-02-05 ENCOUNTER — APPOINTMENT (OUTPATIENT)
Dept: ORTHOPEDIC SURGERY | Facility: CLINIC | Age: 50
End: 2025-02-05
Payer: COMMERCIAL

## 2025-02-05 DIAGNOSIS — G56.01 CARPAL TUNNEL SYNDROME, RIGHT UPPER LIMB: ICD-10-CM

## 2025-02-05 PROCEDURE — 99024 POSTOP FOLLOW-UP VISIT: CPT

## 2025-02-12 ENCOUNTER — APPOINTMENT (OUTPATIENT)
Dept: ORTHOPEDIC SURGERY | Facility: CLINIC | Age: 50
End: 2025-02-12
Payer: COMMERCIAL

## 2025-02-12 ENCOUNTER — NON-APPOINTMENT (OUTPATIENT)
Age: 50
End: 2025-02-12

## 2025-02-12 DIAGNOSIS — M54.12 RADICULOPATHY, CERVICAL REGION: ICD-10-CM

## 2025-02-12 PROCEDURE — 99214 OFFICE O/P EST MOD 30 MIN: CPT

## 2025-03-14 ENCOUNTER — APPOINTMENT (OUTPATIENT)
Dept: ORTHOPEDIC SURGERY | Facility: CLINIC | Age: 50
End: 2025-03-14
Payer: COMMERCIAL

## 2025-03-14 DIAGNOSIS — M54.12 RADICULOPATHY, CERVICAL REGION: ICD-10-CM

## 2025-03-14 PROCEDURE — 99213 OFFICE O/P EST LOW 20 MIN: CPT

## 2025-03-24 ENCOUNTER — NON-APPOINTMENT (OUTPATIENT)
Age: 50
End: 2025-03-24

## 2025-03-24 ENCOUNTER — APPOINTMENT (OUTPATIENT)
Dept: NEUROSURGERY | Facility: CLINIC | Age: 50
End: 2025-03-24
Payer: COMMERCIAL

## 2025-03-24 VITALS — HEIGHT: 59 IN | BODY MASS INDEX: 32.05 KG/M2 | WEIGHT: 159 LBS

## 2025-03-24 DIAGNOSIS — G56.21 LESION OF ULNAR NERVE, RIGHT UPPER LIMB: ICD-10-CM

## 2025-03-24 DIAGNOSIS — M54.12 RADICULOPATHY, CERVICAL REGION: ICD-10-CM

## 2025-03-24 PROCEDURE — 99214 OFFICE O/P EST MOD 30 MIN: CPT

## 2025-05-07 ENCOUNTER — APPOINTMENT (OUTPATIENT)
Dept: NEUROLOGY | Facility: CLINIC | Age: 50
End: 2025-05-07
Payer: COMMERCIAL

## 2025-05-07 PROCEDURE — 95886 MUSC TEST DONE W/N TEST COMP: CPT

## 2025-05-07 PROCEDURE — 95912 NRV CNDJ TEST 11-12 STUDIES: CPT

## 2025-05-14 ENCOUNTER — APPOINTMENT (OUTPATIENT)
Dept: ORTHOPEDIC SURGERY | Facility: CLINIC | Age: 50
End: 2025-05-14
Payer: COMMERCIAL

## 2025-05-14 PROCEDURE — 99213 OFFICE O/P EST LOW 20 MIN: CPT

## 2025-05-21 ENCOUNTER — APPOINTMENT (OUTPATIENT)
Dept: NEUROSURGERY | Facility: CLINIC | Age: 50
End: 2025-05-21
Payer: COMMERCIAL

## 2025-05-21 DIAGNOSIS — M54.12 RADICULOPATHY, CERVICAL REGION: ICD-10-CM

## 2025-05-21 PROCEDURE — 99211 OFF/OP EST MAY X REQ PHY/QHP: CPT

## 2025-06-03 ENCOUNTER — APPOINTMENT (OUTPATIENT)
Dept: NEUROLOGY | Facility: CLINIC | Age: 50
End: 2025-06-03

## 2025-06-03 ENCOUNTER — NON-APPOINTMENT (OUTPATIENT)
Age: 50
End: 2025-06-03

## 2025-07-01 ENCOUNTER — OUTPATIENT (OUTPATIENT)
Dept: OUTPATIENT SERVICES | Facility: HOSPITAL | Age: 50
LOS: 1 days | End: 2025-07-01
Payer: COMMERCIAL

## 2025-07-01 VITALS
OXYGEN SATURATION: 99 % | SYSTOLIC BLOOD PRESSURE: 130 MMHG | HEART RATE: 57 BPM | TEMPERATURE: 98 F | RESPIRATION RATE: 16 BRPM | DIASTOLIC BLOOD PRESSURE: 84 MMHG | HEIGHT: 59 IN | WEIGHT: 162.7 LBS

## 2025-07-01 DIAGNOSIS — M40.12 OTHER SECONDARY KYPHOSIS, CERVICAL REGION: ICD-10-CM

## 2025-07-01 DIAGNOSIS — Z98.890 OTHER SPECIFIED POSTPROCEDURAL STATES: Chronic | ICD-10-CM

## 2025-07-01 DIAGNOSIS — Z98.891 HISTORY OF UTERINE SCAR FROM PREVIOUS SURGERY: Chronic | ICD-10-CM

## 2025-07-01 LAB
ALBUMIN SERPL ELPH-MCNC: 4.2 G/DL — SIGNIFICANT CHANGE UP (ref 3.5–5.2)
ALP SERPL-CCNC: 85 U/L — SIGNIFICANT CHANGE UP (ref 30–115)
ALT FLD-CCNC: 10 U/L — SIGNIFICANT CHANGE UP (ref 0–41)
ANION GAP SERPL CALC-SCNC: 14 MMOL/L — SIGNIFICANT CHANGE UP (ref 7–14)
APTT BLD: 33.7 SEC — SIGNIFICANT CHANGE UP (ref 27–39.2)
AST SERPL-CCNC: 14 U/L — SIGNIFICANT CHANGE UP (ref 0–41)
BILIRUB SERPL-MCNC: <0.2 MG/DL — SIGNIFICANT CHANGE UP (ref 0.2–1.2)
BLD GP AB SCN SERPL QL: SIGNIFICANT CHANGE UP
BUN SERPL-MCNC: 10 MG/DL — SIGNIFICANT CHANGE UP (ref 10–20)
CALCIUM SERPL-MCNC: 9.9 MG/DL — SIGNIFICANT CHANGE UP (ref 8.4–10.5)
CHLORIDE SERPL-SCNC: 102 MMOL/L — SIGNIFICANT CHANGE UP (ref 98–110)
CO2 SERPL-SCNC: 22 MMOL/L — SIGNIFICANT CHANGE UP (ref 17–32)
CREAT SERPL-MCNC: 0.6 MG/DL — LOW (ref 0.7–1.5)
EGFR: 111 ML/MIN/1.73M2 — SIGNIFICANT CHANGE UP
EGFR: 111 ML/MIN/1.73M2 — SIGNIFICANT CHANGE UP
GLUCOSE SERPL-MCNC: 74 MG/DL — SIGNIFICANT CHANGE UP (ref 70–99)
HCT VFR BLD CALC: 35.1 % — LOW (ref 37–47)
HGB BLD-MCNC: 11.4 G/DL — LOW (ref 12–16)
INR BLD: 0.84 RATIO — SIGNIFICANT CHANGE UP (ref 0.65–1.3)
MCHC RBC-ENTMCNC: 26.3 PG — LOW (ref 27–31)
MCHC RBC-ENTMCNC: 32.5 G/DL — SIGNIFICANT CHANGE UP (ref 32–37)
MCV RBC AUTO: 80.9 FL — LOW (ref 81–99)
MRSA PCR RESULT.: NEGATIVE — SIGNIFICANT CHANGE UP
NRBC BLD AUTO-RTO: 0 /100 WBCS — SIGNIFICANT CHANGE UP (ref 0–0)
PLATELET # BLD AUTO: 346 K/UL — SIGNIFICANT CHANGE UP (ref 130–400)
PMV BLD: 9.7 FL — SIGNIFICANT CHANGE UP (ref 7.4–10.4)
POTASSIUM SERPL-MCNC: 4.4 MMOL/L — SIGNIFICANT CHANGE UP (ref 3.5–5)
POTASSIUM SERPL-SCNC: 4.4 MMOL/L — SIGNIFICANT CHANGE UP (ref 3.5–5)
PROT SERPL-MCNC: 7 G/DL — SIGNIFICANT CHANGE UP (ref 6–8)
PROTHROM AB SERPL-ACNC: 9.9 SEC — LOW (ref 9.95–12.87)
RBC # BLD: 4.34 M/UL — SIGNIFICANT CHANGE UP (ref 4.2–5.4)
RBC # FLD: 13.1 % — SIGNIFICANT CHANGE UP (ref 11.5–14.5)
SODIUM SERPL-SCNC: 138 MMOL/L — SIGNIFICANT CHANGE UP (ref 135–146)
T3 SERPL-MCNC: 189 NG/DL — SIGNIFICANT CHANGE UP (ref 80–200)
T4 AB SER-ACNC: 12.8 UG/DL — HIGH (ref 4.6–12)
TSH SERPL-MCNC: 2.85 UIU/ML — SIGNIFICANT CHANGE UP (ref 0.27–4.2)
WBC # BLD: 9.32 K/UL — SIGNIFICANT CHANGE UP (ref 4.8–10.8)
WBC # FLD AUTO: 9.32 K/UL — SIGNIFICANT CHANGE UP (ref 4.8–10.8)

## 2025-07-01 PROCEDURE — 87640 STAPH A DNA AMP PROBE: CPT

## 2025-07-01 PROCEDURE — 80053 COMPREHEN METABOLIC PANEL: CPT

## 2025-07-01 PROCEDURE — 85730 THROMBOPLASTIN TIME PARTIAL: CPT

## 2025-07-01 PROCEDURE — 85610 PROTHROMBIN TIME: CPT

## 2025-07-01 PROCEDURE — 99214 OFFICE O/P EST MOD 30 MIN: CPT | Mod: 25

## 2025-07-01 PROCEDURE — 36415 COLL VENOUS BLD VENIPUNCTURE: CPT

## 2025-07-01 PROCEDURE — 86901 BLOOD TYPING SEROLOGIC RH(D): CPT

## 2025-07-01 PROCEDURE — 84480 ASSAY TRIIODOTHYRONINE (T3): CPT

## 2025-07-01 PROCEDURE — 87641 MR-STAPH DNA AMP PROBE: CPT

## 2025-07-01 PROCEDURE — 84443 ASSAY THYROID STIM HORMONE: CPT

## 2025-07-01 PROCEDURE — 86900 BLOOD TYPING SEROLOGIC ABO: CPT

## 2025-07-01 PROCEDURE — 86850 RBC ANTIBODY SCREEN: CPT

## 2025-07-01 PROCEDURE — 85027 COMPLETE CBC AUTOMATED: CPT

## 2025-07-01 PROCEDURE — 84436 ASSAY OF TOTAL THYROXINE: CPT

## 2025-07-01 RX ORDER — NAPROXEN SODIUM 275 MG
1 TABLET ORAL
Refills: 0 | DISCHARGE

## 2025-07-01 NOTE — H&P PST ADULT - HISTORY OF PRESENT ILLNESS
49 year old female here for CERVICAL 5 - CERVICAL 6 ANTERIOR CERVICAL DISCECTOMY AND FUSION, patient states she been having pain right , neck pain radiating down arm . Patient has the pain constant throughout the day  also radiates to upper neck and left at times.The discomfort is constant keeps dropping things , with hand with tingling and numbess but mostly in the night.  Pain has been continuous since march 2023. Had physical therapy, epidural injections with no relief  FOS= 1-2   denies chest pain sob palp  denies recent uri or uti  Anesthesia Alert  NO--Difficult Airway  NO--History of neck surgery or radiation  NO--Limited ROM of neck  NO--History of Malignant hyperthermia  NO--Personal or family history of Pseudocholinesterase deficiency  NO--Prior Anesthesia Complication  NO--Latex Allergy  NO--Loose teeth  NO--History of Rheumatoid Arthritis  NO--TIFFANI  NO BLEEDING RISK  NO--Other_____    As per patient, this is their complete medical and surgical history, including medications both prescribed or over the counter.  Patient verbalized understanding of instructions and was given the opportunity to ask questions and have them answered.    The patient confirms they have received, reviewed, and understood their preoperative spine education material.  In the event of any questions or concerns leading up to the surgery, the patient is aware of how to contact the surgeon's office or the [facility].    Opioid Risk Assessment Tool (Female)       Family history of substance abuse            Alcohol (1)            Illegal Drugs (2)            Prescription drugs (4)       Personal history of substance abuse            Alcohol (3)            Illegal Drugs (4)            Prescription drugs (5)       Age between 16-45 (1)       History of preadolescent sexual abuse (3)       Psychological disease (ADD, ADHD, OCD, Bipolar Disorder, Schizophrenia, Depression) (2)    Scoring Totals:  Low Risk (0-3)  Moderate Risk (4-7)  High Risk (>/=8)    12.75 points  The higher the score (maximum 58.2), the higher the functional status.  4.31 METs    0 points  RCRI Score  3.9 %  Risk of major cardiac event

## 2025-07-02 DIAGNOSIS — M40.12 OTHER SECONDARY KYPHOSIS, CERVICAL REGION: ICD-10-CM

## 2025-07-10 ENCOUNTER — NON-APPOINTMENT (OUTPATIENT)
Age: 50
End: 2025-07-10

## 2025-07-14 RX ORDER — GABAPENTIN 400 MG/1
300 CAPSULE ORAL ONCE
Refills: 0 | Status: COMPLETED | OUTPATIENT
Start: 2025-07-15 | End: 2025-07-15

## 2025-07-14 RX ORDER — ACETAMINOPHEN 500 MG/5ML
1000 LIQUID (ML) ORAL ONCE
Refills: 0 | Status: COMPLETED | OUTPATIENT
Start: 2025-07-15 | End: 2025-07-15

## 2025-07-14 RX ORDER — APREPITANT 40 MG/1
40 CAPSULE ORAL ONCE
Refills: 0 | Status: COMPLETED | OUTPATIENT
Start: 2025-07-15 | End: 2025-07-15

## 2025-07-14 RX ORDER — CELECOXIB 50 MG/1
200 CAPSULE ORAL ONCE
Refills: 0 | Status: COMPLETED | OUTPATIENT
Start: 2025-07-15 | End: 2025-07-15

## 2025-07-14 NOTE — ASU PATIENT PROFILE, ADULT - FALL HARM RISK - UNIVERSAL INTERVENTIONS
Bed in lowest position, wheels locked, appropriate side rails in place/Call bell, personal items and telephone in reach/Instruct patient to call for assistance before getting out of bed or chair/Non-slip footwear when patient is out of bed/Mack to call system/Physically safe environment - no spills, clutter or unnecessary equipment/Purposeful Proactive Rounding/Room/bathroom lighting operational, light cord in reach

## 2025-07-15 ENCOUNTER — INPATIENT (INPATIENT)
Facility: HOSPITAL | Age: 50
LOS: 0 days | Discharge: HOME | DRG: 347 | End: 2025-07-16
Attending: STUDENT IN AN ORGANIZED HEALTH CARE EDUCATION/TRAINING PROGRAM | Admitting: STUDENT IN AN ORGANIZED HEALTH CARE EDUCATION/TRAINING PROGRAM
Payer: COMMERCIAL

## 2025-07-15 ENCOUNTER — APPOINTMENT (OUTPATIENT)
Dept: ORTHOPEDIC SURGERY | Facility: HOSPITAL | Age: 50
End: 2025-07-15

## 2025-07-15 VITALS
TEMPERATURE: 98 F | OXYGEN SATURATION: 99 % | RESPIRATION RATE: 18 BRPM | HEIGHT: 59 IN | WEIGHT: 162.7 LBS | DIASTOLIC BLOOD PRESSURE: 80 MMHG | HEART RATE: 81 BPM | SYSTOLIC BLOOD PRESSURE: 136 MMHG

## 2025-07-15 DIAGNOSIS — Z98.890 OTHER SPECIFIED POSTPROCEDURAL STATES: Chronic | ICD-10-CM

## 2025-07-15 DIAGNOSIS — M40.12 OTHER SECONDARY KYPHOSIS, CERVICAL REGION: ICD-10-CM

## 2025-07-15 DIAGNOSIS — Z98.891 HISTORY OF UTERINE SCAR FROM PREVIOUS SURGERY: Chronic | ICD-10-CM

## 2025-07-15 LAB — ABO RH CONFIRMATION: SIGNIFICANT CHANGE UP

## 2025-07-15 PROCEDURE — 80048 BASIC METABOLIC PNL TOTAL CA: CPT

## 2025-07-15 PROCEDURE — 97162 PT EVAL MOD COMPLEX 30 MIN: CPT | Mod: GP

## 2025-07-15 PROCEDURE — C9399: CPT

## 2025-07-15 PROCEDURE — 85027 COMPLETE CBC AUTOMATED: CPT

## 2025-07-15 PROCEDURE — 97166 OT EVAL MOD COMPLEX 45 MIN: CPT | Mod: GO

## 2025-07-15 PROCEDURE — 36415 COLL VENOUS BLD VENIPUNCTURE: CPT

## 2025-07-15 PROCEDURE — 80053 COMPREHEN METABOLIC PANEL: CPT

## 2025-07-15 RX ORDER — ONDANSETRON HCL/PF 4 MG/2 ML
4 VIAL (ML) INJECTION ONCE
Refills: 0 | Status: DISCONTINUED | OUTPATIENT
Start: 2025-07-15 | End: 2025-07-16

## 2025-07-15 RX ORDER — CYCLOBENZAPRINE HYDROCHLORIDE 15 MG/1
5 CAPSULE, EXTENDED RELEASE ORAL THREE TIMES A DAY
Refills: 0 | Status: DISCONTINUED | OUTPATIENT
Start: 2025-07-15 | End: 2025-07-16

## 2025-07-15 RX ORDER — GABAPENTIN 400 MG/1
100 CAPSULE ORAL EVERY 8 HOURS
Refills: 0 | Status: DISCONTINUED | OUTPATIENT
Start: 2025-07-15 | End: 2025-07-16

## 2025-07-15 RX ORDER — OXYCODONE 5 MG/1
5 TABLET ORAL
Qty: 21 | Refills: 0 | Status: ACTIVE | COMMUNITY
Start: 2025-07-15 | End: 1900-01-01

## 2025-07-15 RX ORDER — OXYCODONE HYDROCHLORIDE 30 MG/1
5 TABLET ORAL EVERY 6 HOURS
Refills: 0 | Status: DISCONTINUED | OUTPATIENT
Start: 2025-07-15 | End: 2025-07-16

## 2025-07-15 RX ORDER — SODIUM CHLORIDE 9 G/1000ML
1000 INJECTION, SOLUTION INTRAVENOUS
Refills: 0 | Status: DISCONTINUED | OUTPATIENT
Start: 2025-07-15 | End: 2025-07-16

## 2025-07-15 RX ORDER — POLYETHYLENE GLYCOL 3350 17 G/17G
17 POWDER, FOR SOLUTION ORAL EVERY 12 HOURS
Refills: 0 | Status: DISCONTINUED | OUTPATIENT
Start: 2025-07-15 | End: 2025-07-16

## 2025-07-15 RX ORDER — ACETAMINOPHEN 325 MG/1
325 TABLET ORAL
Qty: 90 | Refills: 1 | Status: ACTIVE | COMMUNITY
Start: 2025-07-15 | End: 1900-01-01

## 2025-07-15 RX ORDER — CEFAZOLIN SODIUM IN 0.9 % NACL 3 G/100 ML
2000 INTRAVENOUS SOLUTION, PIGGYBACK (ML) INTRAVENOUS EVERY 8 HOURS
Refills: 0 | Status: COMPLETED | OUTPATIENT
Start: 2025-07-15 | End: 2025-07-16

## 2025-07-15 RX ORDER — ASPIRIN 325 MG
325 TABLET ORAL DAILY
Refills: 0 | Status: DISCONTINUED | OUTPATIENT
Start: 2025-07-16 | End: 2025-07-16

## 2025-07-15 RX ORDER — HYDROMORPHONE/SOD CHLOR,ISO/PF 2 MG/10 ML
0.5 SYRINGE (ML) INJECTION
Refills: 0 | Status: DISCONTINUED | OUTPATIENT
Start: 2025-07-15 | End: 2025-07-16

## 2025-07-15 RX ORDER — ALBUTEROL SULFATE 2.5 MG/3ML
2 VIAL, NEBULIZER (ML) INHALATION EVERY 6 HOURS
Refills: 0 | Status: DISCONTINUED | OUTPATIENT
Start: 2025-07-15 | End: 2025-07-16

## 2025-07-15 RX ORDER — ACETAMINOPHEN 500 MG/5ML
650 LIQUID (ML) ORAL EVERY 6 HOURS
Refills: 0 | Status: DISCONTINUED | OUTPATIENT
Start: 2025-07-15 | End: 2025-07-16

## 2025-07-15 RX ORDER — METHOCARBAMOL 750 MG/1
750 TABLET, FILM COATED ORAL
Qty: 30 | Refills: 0 | Status: ACTIVE | COMMUNITY
Start: 2025-07-15 | End: 1900-01-01

## 2025-07-15 RX ORDER — SENNA 187 MG
2 TABLET ORAL AT BEDTIME
Refills: 0 | Status: DISCONTINUED | OUTPATIENT
Start: 2025-07-15 | End: 2025-07-16

## 2025-07-15 RX ADMIN — Medication 650 MILLIGRAM(S): at 18:13

## 2025-07-15 RX ADMIN — Medication 0.5 MILLIGRAM(S): at 12:25

## 2025-07-15 RX ADMIN — GABAPENTIN 100 MILLIGRAM(S): 400 CAPSULE ORAL at 22:56

## 2025-07-15 RX ADMIN — CELECOXIB 200 MILLIGRAM(S): 50 CAPSULE ORAL at 07:44

## 2025-07-15 RX ADMIN — Medication 0.5 MILLIGRAM(S): at 12:15

## 2025-07-15 RX ADMIN — APREPITANT 40 MILLIGRAM(S): 40 CAPSULE ORAL at 07:45

## 2025-07-15 RX ADMIN — Medication 1000 MILLIGRAM(S): at 07:44

## 2025-07-15 RX ADMIN — Medication 2 TABLET(S): at 22:55

## 2025-07-15 RX ADMIN — SODIUM CHLORIDE 100 MILLILITER(S): 9 INJECTION, SOLUTION INTRAVENOUS at 12:21

## 2025-07-15 RX ADMIN — Medication 650 MILLIGRAM(S): at 23:58

## 2025-07-15 RX ADMIN — POLYETHYLENE GLYCOL 3350 17 GRAM(S): 17 POWDER, FOR SOLUTION ORAL at 18:13

## 2025-07-15 RX ADMIN — OXYCODONE HYDROCHLORIDE 5 MILLIGRAM(S): 30 TABLET ORAL at 16:52

## 2025-07-15 RX ADMIN — Medication 650 MILLIGRAM(S): at 22:55

## 2025-07-15 RX ADMIN — Medication 100 MILLIGRAM(S): at 18:14

## 2025-07-15 NOTE — CONSULT NOTE ADULT - ASSESSMENT
IMPRESSION: Rehab of cervical radiculopathy /  s/p C5 - C6 ACDF    PRECAUTIONS: [   ] Cardiac  [   ] Respiratory  [   ] Seizures [   ] Contact Isolation  [   ] Droplet Isolation  [   ] Other    Weight Bearing Status:     RECOMMENDATION:    Out of Bed to Chair     DVT/Decubiti Prophylaxis    REHAB PLAN:     [  x  ] Bedside P/T 3-5 times a week   [    ]   Bedside O/T  2-3 times a week             [    ] Speech Therapy               [    ]  No Rehab Therapy Indicated   Conditioning/ROM                                    ADL  Bed Mobility                                               Conditioning/ROM  Transfers                                                     Bed Mobility  Sitting /Standing Balance                         Transfers                                        Gait Training                                               Sitting/Standing Balance  Stair Training [   ]Applicable                    Home equipment Eval                                                                        Splinting  [   ] Only      GOALS:   ADL   [    ]   Independent                    Transfers  [x    ] Independent                          Ambulation  [ x   ] Independent     [    x ] With device                            [    ]  CG                                                         [    ]  CG                                                                  [    ] CG                            [    ] Min A                                                   [    ] Min A                                                              [    ] Min  A          DISCHARGE PLAN:   [    ]  Good candidate for Intensive Rehabilitation/Hospital based                                             Will tolerate 3hrs Intensive Rehab Daily                                       [     ]  Short Term Rehab in Skilled Nursing Facility                                       [   x  ]  Home with Outpatient or  services                                         [     ]  Possible Candidate for Intensive Hospital based Rehab

## 2025-07-15 NOTE — CONSULT NOTE ADULT - SUBJECTIVE AND OBJECTIVE BOX
HPI: 50 yo Female admitted to [facility] on 7/15/25 for cervical radiculopathy and underwent s/p C5-C6 ACDF      PAST MEDICAL & SURGICAL HISTORY:  Asthma      Rheumatoid arthritis      Obese      S/P       S/P D&C (status post dilation and curettage)  miscarriage      S/P inguinal hernia repair  right          Hospital Course:    TODAY'S SUBJECTIVE & REVIEW OF SYMPTOMS:     Constitutional WNL   Cardio WNL   Resp WNL   GI WNL  Heme WNL  Endo WNL  Skin WNL  MSK neck pain   Neuro WNL  Cognitive WNL  Psych WNL      MEDICATIONS  (STANDING):  acetaminophen     Tablet .. 650 milliGRAM(s) Oral every 6 hours  ceFAZolin   IVPB 2000 milliGRAM(s) IV Intermittent every 8 hours  chlorhexidine 2% Cloths 1 Application(s) Topical <User Schedule>  gabapentin 100 milliGRAM(s) Oral every 8 hours  lactated ringers. 1000 milliLiter(s) (100 mL/Hr) IV Continuous <Continuous>  polyethylene glycol 3350 17 Gram(s) Oral every 12 hours  senna 2 Tablet(s) Oral at bedtime    MEDICATIONS  (PRN):  albuterol    90 MICROgram(s) HFA Inhaler 2 Puff(s) Inhalation every 6 hours PRN Shortness of Breath and/or Wheezing  benzocaine/menthol Lozenge 1 Lozenge Oral every 8 hours PRN Sore Throat  cyclobenzaprine 5 milliGRAM(s) Oral three times a day PRN Muscle Spasm  HYDROmorphone  Injectable 0.5 milliGRAM(s) IV Push every 10 minutes PRN Moderate Pain (4 - 6)  ondansetron Injectable 4 milliGRAM(s) IV Push once PRN Nausea and/or Vomiting  oxyCODONE    IR 5 milliGRAM(s) Oral every 6 hours PRN Severe Pain (7 - 10)      FAMILY HISTORY:      Allergies    No Known Allergies    Intolerances        SOCIAL HISTORY:    [  ] Etoh  [  ] Smoking  [  ] Substance abuse     Home Environment:  [   ] Home Alone  [ x  ] Lives with Family  [   ] Home Health Aid    Dwelling:  [   ] Apartment  [ x  ] Private House  [   ] Adult Home  [   ] Skilled Nursing Facility      [   ] Short Term  [   ] Long Term  [  x ] Stairs       Elevator [   ]    FUNCTIONAL STATUS PTA: (Check all that apply)  Ambulation: [ x   ]Independent    [   ] Dependent     [   ] Non-Ambulatory  Assistive Device: [   ] SA Cane  [   ]  Q Cane  [   ] Walker  [   ]  Wheelchair  ADL : [ x  ] Independent  [    ]  Dependent       Vital Signs Last 24 Hrs  T(C): 36.3 (15 Jul 2025 11:11), Max: 36.6 (15 Jul 2025 07:25)  T(F): 97.3 (15 Jul 2025 11:11), Max: 97.8 (15 Jul 2025 07:25)  HR: 98 (15 Jul 2025 14:00) (81 - 98)  BP: 146/71 (15 Jul 2025 14:00) (136/80 - 172/86)  BP(mean): --  RR: 16 (15 Jul 2025 14:00) (15 - 18)  SpO2: 100% (15 Jul 2025 14:00) (98% - 100%)    Parameters below as of 15 Jul 2025 11:11  Patient On (Oxygen Delivery Method): nasal cannula  O2 Flow (L/min): 4        PHYSICAL EXAM: Awake & Alert  GENERAL: NAD  HEAD:  Normocephalic  CHEST/LUNG: Clear   HEART: S1S2+  ABDOMEN: Soft, Nontender  EXTREMITIES:  no calf tenderness    NERVOUS SYSTEM:  Cranial Nerves 2-12 intact [   ] Abnormal  [   ]  ROM: WFL all extremities [ x  ]  Abnormal [   ]  Motor Strength: WFL all extremities  [ x  ]  Abnormal [   ]  Sensation: intact to light touch [ x  ] Abnormal [   ]    FUNCTIONAL STATUS:  Bed Mobility: Independent [   ]  Supervision [   ]  Needs Assistance [  x ]  N/A [   ]  Transfers: Independent [   ]  Supervision [   ]  Needs Assistance [   ]  N/A [   ]   Ambulation: Independent [   ]  Supervision [   ]  Needs Assistance [   ]  N/A [   ]  ADL: Independent [   ] Requires Assistance [   ] N/A [   ]      LABS:                RADIOLOGY & ADDITIONAL STUDIES:

## 2025-07-15 NOTE — PATIENT PROFILE ADULT - IN THE PAST 12 MONTHS, HAS LACK OF RELIABLE TRANSPORTATION KEPT YOU FROM MEDICAL APPOINTMENTS, MEETINGS, WORK OR FROM GETTING THINGS NEEDED FOR DAILY LIVING?
Physical Therapy Daily Progress Note    [address] [facility], [address]  [place] [address]      Patient: Kavitha Barnhart   : 1947  Diagnosis/ICD-10 Code:  Chronic pain of both knees [M25.561, M25.562, G89.29]  Referring practitioner: JACK Hopper  Date of Initial Visit: Type: THERAPY  Noted: 2023  Today's Date: 10/4/2023  Patient seen for 10 sessions           Patient reports: her knees are still sore but she thinks they are gradually returning to baseline.      Objective   See Exercise, Manual, and Modality Logs for complete treatment.       Assessment/Plan  L posterior knee pain during TKE replicated with palpation of medial gastroc. This was cleared following STM to gastroc. B medial/lateral joint lines were TTP, as well as R patellar tendon. Continued with OKC quad eccentrics on R, which patient tolerated well. Patient advised to resume these and calf stretching at home. Held CKC quad eccentrics.    Patient expressed concern regarding receiving steroid injections in both knees, but was encouraged to discuss these concerns with ortho when she sees them Friday.        Timed:  Manual Therapy:    10     mins  61848;  Therapeutic Exercise:    22     mins  99064;     Neuromuscular Rae:   0    mins  12531;    Therapeutic Activity:     10     mins  00904;     Gait Trainin     mins  92607;     Ultrasound:     0     mins  33191;    Electrical Stimulation:    0     mins  86648 ( );    Untimed:  Electrical Stimulation:    0     mins  80865 (MC );  Mechanical Traction:    0     mins  91362;     Timed Treatment:   42   mins   Total Treatment:     42   mins    Valentín Donald PT  Physical Therapist   no

## 2025-07-15 NOTE — PATIENT PROFILE ADULT - FALL HARM RISK - HARM RISK INTERVENTIONS

## 2025-07-15 NOTE — CHART NOTE - NSCHARTNOTEFT_GEN_A_CORE
PACU ANESTHESIA ADMISSION NOTE      Procedure:   Post op diagnosis:      ____  Intubated  TV:______       Rate: ______      FiO2: ______    __x__  Patent Airway    __x__  Full return of protective reflexes    __x__  Full recovery from anesthesia / back to baseline     Vitals:   T: 37           R: 18                BP:  159/89                Sat:   100                P: 83      Mental Status:  __x__ Awake   ___x__ Alert   _____ Drowsy   _____ Sedated    Nausea/Vomiting:  _x___ NO  ______Yes,   See Post - Op Orders          Pain Scale (0-10):  _____    Treatment: __x__ None    ____ See Post - Op/PCA Orders    Post - Operative Fluids:   ____ Oral   ___x_ See Post - Op Orders    Plan: Discharge:   __x__Home       _____Floor     _____Critical Care    _____  Other:_________________    Comments:    Uneventful anesthesia. Patient transported to  spontaneously breathing and hemodynamically stable.

## 2025-07-15 NOTE — PRE-ANESTHESIA EVALUATION ADULT - NSANTHOSAYNRD_GEN_A_CORE
No. TIFFANI screening performed.  STOP BANG Legend: 0-2 = LOW Risk; 3-4 = INTERMEDIATE Risk; 5-8 = HIGH Risk

## 2025-07-16 ENCOUNTER — TRANSCRIPTION ENCOUNTER (OUTPATIENT)
Age: 50
End: 2025-07-16

## 2025-07-16 VITALS
TEMPERATURE: 97 F | OXYGEN SATURATION: 99 % | SYSTOLIC BLOOD PRESSURE: 130 MMHG | HEART RATE: 81 BPM | DIASTOLIC BLOOD PRESSURE: 85 MMHG | RESPIRATION RATE: 18 BRPM

## 2025-07-16 LAB
ALBUMIN SERPL ELPH-MCNC: 3.8 G/DL — SIGNIFICANT CHANGE UP (ref 3.5–5.2)
ALP SERPL-CCNC: 83 U/L — SIGNIFICANT CHANGE UP (ref 30–115)
ALT FLD-CCNC: 12 U/L — SIGNIFICANT CHANGE UP (ref 0–41)
ANION GAP SERPL CALC-SCNC: 13 MMOL/L — SIGNIFICANT CHANGE UP (ref 7–14)
ANION GAP SERPL CALC-SCNC: 15 MMOL/L — HIGH (ref 7–14)
AST SERPL-CCNC: 17 U/L — SIGNIFICANT CHANGE UP (ref 0–41)
BILIRUB SERPL-MCNC: <0.2 MG/DL — SIGNIFICANT CHANGE UP (ref 0.2–1.2)
BUN SERPL-MCNC: 10 MG/DL — SIGNIFICANT CHANGE UP (ref 10–20)
BUN SERPL-MCNC: 10 MG/DL — SIGNIFICANT CHANGE UP (ref 10–20)
CALCIUM SERPL-MCNC: 8.2 MG/DL — LOW (ref 8.4–10.5)
CALCIUM SERPL-MCNC: 9.4 MG/DL — SIGNIFICANT CHANGE UP (ref 8.4–10.5)
CHLORIDE SERPL-SCNC: 102 MMOL/L — SIGNIFICANT CHANGE UP (ref 98–110)
CHLORIDE SERPL-SCNC: 102 MMOL/L — SIGNIFICANT CHANGE UP (ref 98–110)
CO2 SERPL-SCNC: 21 MMOL/L — SIGNIFICANT CHANGE UP (ref 17–32)
CO2 SERPL-SCNC: 22 MMOL/L — SIGNIFICANT CHANGE UP (ref 17–32)
CREAT SERPL-MCNC: 0.6 MG/DL — LOW (ref 0.7–1.5)
CREAT SERPL-MCNC: 0.6 MG/DL — LOW (ref 0.7–1.5)
EGFR: 111 ML/MIN/1.73M2 — SIGNIFICANT CHANGE UP
GLUCOSE SERPL-MCNC: 111 MG/DL — HIGH (ref 70–99)
GLUCOSE SERPL-MCNC: 121 MG/DL — HIGH (ref 70–99)
HCT VFR BLD CALC: 35.4 % — LOW (ref 37–47)
HCT VFR BLD CALC: 35.8 % — LOW (ref 37–47)
HGB BLD-MCNC: 11.7 G/DL — LOW (ref 12–16)
HGB BLD-MCNC: 11.7 G/DL — LOW (ref 12–16)
MCHC RBC-ENTMCNC: 26.5 PG — LOW (ref 27–31)
MCHC RBC-ENTMCNC: 26.7 PG — LOW (ref 27–31)
MCHC RBC-ENTMCNC: 32.7 G/DL — SIGNIFICANT CHANGE UP (ref 32–37)
MCHC RBC-ENTMCNC: 33.1 G/DL — SIGNIFICANT CHANGE UP (ref 32–37)
MCV RBC AUTO: 80.6 FL — LOW (ref 81–99)
MCV RBC AUTO: 81 FL — SIGNIFICANT CHANGE UP (ref 81–99)
NRBC BLD AUTO-RTO: 0 /100 WBCS — SIGNIFICANT CHANGE UP (ref 0–0)
NRBC BLD AUTO-RTO: 0 /100 WBCS — SIGNIFICANT CHANGE UP (ref 0–0)
PLATELET # BLD AUTO: 393 K/UL — SIGNIFICANT CHANGE UP (ref 130–400)
PLATELET # BLD AUTO: 397 K/UL — SIGNIFICANT CHANGE UP (ref 130–400)
PMV BLD: 9.4 FL — SIGNIFICANT CHANGE UP (ref 7.4–10.4)
PMV BLD: 9.6 FL — SIGNIFICANT CHANGE UP (ref 7.4–10.4)
POTASSIUM SERPL-MCNC: 4.1 MMOL/L — SIGNIFICANT CHANGE UP (ref 3.5–5)
POTASSIUM SERPL-MCNC: 4.4 MMOL/L — SIGNIFICANT CHANGE UP (ref 3.5–5)
POTASSIUM SERPL-SCNC: 4.1 MMOL/L — SIGNIFICANT CHANGE UP (ref 3.5–5)
POTASSIUM SERPL-SCNC: 4.4 MMOL/L — SIGNIFICANT CHANGE UP (ref 3.5–5)
PROT SERPL-MCNC: 6.4 G/DL — SIGNIFICANT CHANGE UP (ref 6–8)
RBC # BLD: 4.39 M/UL — SIGNIFICANT CHANGE UP (ref 4.2–5.4)
RBC # BLD: 4.42 M/UL — SIGNIFICANT CHANGE UP (ref 4.2–5.4)
RBC # FLD: 13.2 % — SIGNIFICANT CHANGE UP (ref 11.5–14.5)
RBC # FLD: 13.3 % — SIGNIFICANT CHANGE UP (ref 11.5–14.5)
SODIUM SERPL-SCNC: 137 MMOL/L — SIGNIFICANT CHANGE UP (ref 135–146)
SODIUM SERPL-SCNC: 138 MMOL/L — SIGNIFICANT CHANGE UP (ref 135–146)
WBC # BLD: 10.18 K/UL — SIGNIFICANT CHANGE UP (ref 4.8–10.8)
WBC # BLD: 9.53 K/UL — SIGNIFICANT CHANGE UP (ref 4.8–10.8)
WBC # FLD AUTO: 10.18 K/UL — SIGNIFICANT CHANGE UP (ref 4.8–10.8)
WBC # FLD AUTO: 9.53 K/UL — SIGNIFICANT CHANGE UP (ref 4.8–10.8)

## 2025-07-16 RX ORDER — OXYCODONE HYDROCHLORIDE 30 MG/1
1 TABLET ORAL
Qty: 24 | Refills: 0
Start: 2025-07-16 | End: 2025-07-21

## 2025-07-16 RX ORDER — CYCLOBENZAPRINE HYDROCHLORIDE 15 MG/1
1 CAPSULE, EXTENDED RELEASE ORAL
Qty: 42 | Refills: 0
Start: 2025-07-16 | End: 2025-07-29

## 2025-07-16 RX ORDER — SENNA 187 MG
2 TABLET ORAL
Qty: 30 | Refills: 0
Start: 2025-07-16 | End: 2025-07-30

## 2025-07-16 RX ORDER — GABAPENTIN 400 MG/1
1 CAPSULE ORAL
Qty: 42 | Refills: 0
Start: 2025-07-16 | End: 2025-07-29

## 2025-07-16 RX ORDER — CYCLOBENZAPRINE HYDROCHLORIDE 15 MG/1
1 CAPSULE, EXTENDED RELEASE ORAL
Refills: 0 | DISCHARGE

## 2025-07-16 RX ORDER — ACETAMINOPHEN 500 MG/5ML
2 LIQUID (ML) ORAL
Qty: 80 | Refills: 0
Start: 2025-07-16 | End: 2025-07-25

## 2025-07-16 RX ORDER — GABAPENTIN 400 MG/1
1 CAPSULE ORAL
Refills: 0 | DISCHARGE

## 2025-07-16 RX ORDER — NAPROXEN SODIUM 275 MG
1 TABLET ORAL
Refills: 0 | DISCHARGE

## 2025-07-16 RX ADMIN — GABAPENTIN 100 MILLIGRAM(S): 400 CAPSULE ORAL at 13:29

## 2025-07-16 RX ADMIN — GABAPENTIN 100 MILLIGRAM(S): 400 CAPSULE ORAL at 06:18

## 2025-07-16 RX ADMIN — Medication 100 MILLIGRAM(S): at 06:17

## 2025-07-16 RX ADMIN — Medication 1 APPLICATION(S): at 06:19

## 2025-07-16 RX ADMIN — Medication 650 MILLIGRAM(S): at 12:22

## 2025-07-16 RX ADMIN — Medication 325 MILLIGRAM(S): at 12:22

## 2025-07-16 RX ADMIN — Medication 650 MILLIGRAM(S): at 06:18

## 2025-07-16 RX ADMIN — POLYETHYLENE GLYCOL 3350 17 GRAM(S): 17 POWDER, FOR SOLUTION ORAL at 06:18

## 2025-07-16 NOTE — DISCHARGE NOTE NURSING/CASE MANAGEMENT/SOCIAL WORK - NSDCPEFALRISK_GEN_ALL_CORE
For information on Fall & Injury Prevention, visit: [email or web address] OR  [email or web address] OR  https://www.cdc.gov/steadi/patient.html

## 2025-07-16 NOTE — DISCHARGE NOTE PROVIDER - HOSPITAL COURSE
Pt underwent ACDF C5-C6 on 7/15/25, tolerated procedure well. Pt was given IV abx for infection ppx, pain control meds, DVT/GI ppx. Pt worked with PT/OT in hospital, stable for discharge home with OP PT.

## 2025-07-16 NOTE — OCCUPATIONAL THERAPY INITIAL EVALUATION ADULT - GENERAL OBSERVATIONS, REHAB EVAL
Pt encountered semi manriquez in bed, + IV locked, + soft cervical collar. Pt agreeable to bedside OT assessment, may be seen as confirmed with RN. Pt returned to seated EOB, + IV locked, + soft cervical collar. +PT Charisma present at bedside

## 2025-07-16 NOTE — OCCUPATIONAL THERAPY INITIAL EVALUATION ADULT - ADDITIONAL COMMENTS
Pt endorses residing with son in his apartment [address], 1 level inside, + stall shower, + adjustable bed, no AD or DME needed PTA

## 2025-07-16 NOTE — DISCHARGE NOTE NURSING/CASE MANAGEMENT/SOCIAL WORK - FINANCIAL ASSISTANCE
[facility] provides services at a reduced cost to those who are determined to be eligible through [facility]’s financial assistance program. Information regarding [facility]’s financial assistance program can be found by going to [email or web address] or by calling [phone].

## 2025-07-16 NOTE — DISCHARGE NOTE PROVIDER - CARE PROVIDER_API CALL
Salas Swift)  Orthopaedic Surgery  [address] [place] [address]  Phone: [phone]  Fax: [phone]  Follow Up Time:

## 2025-07-16 NOTE — OCCUPATIONAL THERAPY INITIAL EVALUATION ADULT - TRANSFER TRAINING, PT EVAL
Pt will perform toilet transfer and shower transfer with supervision by discharge using most appropriate AD and DME

## 2025-07-16 NOTE — DISCHARGE NOTE PROVIDER - NSDCMRMEDTOKEN_GEN_ALL_CORE_FT
cyclobenzaprine 5 mg oral tablet: 1 tab(s) orally once a day  gabapentin 100 mg oral capsule: 1 cap(s) orally prn   mg oral tablet: 1 tab(s) orally 3 times a day as needed for  moderate pain  naproxen 500 mg (as sodium) oral tablet, extended release: 1 tab(s) orally prn  Ventolin 2 mg oral tablet: 0.5 tab(s) orally once a day as needed for - took it last 6 months ago   acetaminophen 325 mg oral tablet: 2 tab(s) orally every 6 hours MDD: 8  cyclobenzaprine 5 mg oral tablet: 1 tab(s) orally 3 times a day as needed for Muscle Spasm MDD: 3  gabapentin 100 mg oral capsule: 1 cap(s) orally every 8 hours MDD: 3  oxyCODONE 5 mg oral tablet: 1 tab(s) orally every 6 hours as needed for Severe Pain (7 - 10) MDD: 4  senna leaf extract oral tablet: 2 tab(s) orally once a day (at bedtime) as needed for  constipation MDD: 2  Ventolin 2 mg oral tablet: 0.5 tab(s) orally once a day as needed for - took it last 6 months ago

## 2025-07-16 NOTE — DISCHARGE NOTE PROVIDER - NSDCCPCAREPLAN_GEN_ALL_CORE_FT
PRINCIPAL DISCHARGE DIAGNOSIS  Diagnosis: Status post cervical discectomy  Assessment and Plan of Treatment:      DC instructions yes

## 2025-07-16 NOTE — OCCUPATIONAL THERAPY INITIAL EVALUATION ADULT - NSACTIVITYREC_GEN_A_OT
Patient requires supervision with activities of daily living. OT recommends D/C to home with assistance when medically appropriate. OT recommends shower chair for D/C to home. Refer to evaluation/treatment for details.

## 2025-07-16 NOTE — PHYSICAL THERAPY INITIAL EVALUATION ADULT - PERTINENT HX OF CURRENT PROBLEM, REHAB EVAL
49 year old female here for CERVICAL 5 - CERVICAL 6 ANTERIOR CERVICAL DISCECTOMY AND FUSION, patient states she been having pain right , neck pain radiating down arm . Patient has the pain constant throughout the day  also radiates to upper neck and left at times.The discomfort is constant keeps dropping things , with hand with tingling and numbess but mostly in the night.  Pain has been continuous since march 2023. Had physical therapy, epidural injections with no relief

## 2025-07-16 NOTE — PROGRESS NOTE ADULT - SUBJECTIVE AND OBJECTIVE BOX
ORTHOPEDIC POST-OP CHECK    Subjective: POD0 s/p C5 - C6 ACDF. Seen and examined at bedside. Doing well, pain controlled. Denies fevers, numbness/tingling. No other complaints.    MEDICATIONS  (STANDING):  acetaminophen     Tablet .. 650 milliGRAM(s) Oral every 6 hours  ceFAZolin   IVPB 2000 milliGRAM(s) IV Intermittent every 8 hours  chlorhexidine 2% Cloths 1 Application(s) Topical <User Schedule>  gabapentin 100 milliGRAM(s) Oral every 8 hours  lactated ringers. 1000 milliLiter(s) (100 mL/Hr) IV Continuous <Continuous>  polyethylene glycol 3350 17 Gram(s) Oral every 12 hours  senna 2 Tablet(s) Oral at bedtime    MEDICATIONS  (PRN):  albuterol    90 MICROgram(s) HFA Inhaler 2 Puff(s) Inhalation every 6 hours PRN Shortness of Breath and/or Wheezing  benzocaine/menthol Lozenge 1 Lozenge Oral every 8 hours PRN Sore Throat  cyclobenzaprine 5 milliGRAM(s) Oral three times a day PRN Muscle Spasm  HYDROmorphone  Injectable 0.5 milliGRAM(s) IV Push every 10 minutes PRN Moderate Pain (4 - 6)  ondansetron Injectable 4 milliGRAM(s) IV Push once PRN Nausea and/or Vomiting  oxyCODONE    IR 5 milliGRAM(s) Oral every 6 hours PRN Severe Pain (7 - 10)      Objective:  T(C): 36.3 (07-15-25 @ 11:11), Max: 36.6 (07-15-25 @ 07:25)  HR: 98 (07-15-25 @ 14:00) (81 - 98)  BP: 146/71 (07-15-25 @ 14:00) (136/80 - 172/86)  RR: 16 (07-15-25 @ 14:00) (15 - 18)  SpO2: 100% (07-15-25 @ 14:00) (98% - 100%)    Physical Exam:    Neck:   Dressing c/d/i    A/P: 49yFemale POD0 s/p C5-C6 ACDF on 7/15/2025, doing well.    - Activity: WBAT   - Abx: Ancef 2g q8hr x3 doses for a total of 24hrs post-operatively  - DVT PPx: LVX per primary team  - Pain control  - IS encouraged  - AM labs  - PT/Rehab  - D/C planning    - If discharged, patient should follow up with Dr. Garcia at [address] [place] [address]., phone [phone].  - Patient should be discharged on 6 weeks (from surgery date) of Aspirin 325mg daily for DVT prophylaxis as their mobility/function/ambulation will be decreased due to lower extremity orthopaedic surgery.
    49yFemale POD 1s/p C5-C6 ACDF on 7/15/2025      MEDICATIONS  (STANDING):  acetaminophen     Tablet .. 650 milliGRAM(s) Oral every 6 hours  aspirin 325 milliGRAM(s) Oral daily  chlorhexidine 2% Cloths 1 Application(s) Topical <User Schedule>  gabapentin 100 milliGRAM(s) Oral every 8 hours  lactated ringers. 1000 milliLiter(s) (100 mL/Hr) IV Continuous <Continuous>  polyethylene glycol 3350 17 Gram(s) Oral every 12 hours  senna 2 Tablet(s) Oral at bedtime    MEDICATIONS  (PRN):  albuterol    90 MICROgram(s) HFA Inhaler 2 Puff(s) Inhalation every 6 hours PRN Shortness of Breath and/or Wheezing  benzocaine/menthol Lozenge 1 Lozenge Oral every 8 hours PRN Sore Throat  cyclobenzaprine 5 milliGRAM(s) Oral three times a day PRN Muscle Spasm  HYDROmorphone  Injectable 0.5 milliGRAM(s) IV Push every 10 minutes PRN Moderate Pain (4 - 6)  ondansetron Injectable 4 milliGRAM(s) IV Push once PRN Nausea and/or Vomiting  oxyCODONE    IR 5 milliGRAM(s) Oral every 6 hours PRN Severe Pain (7 - 10)    Seen and examined at bedside. Doing well, pain controlled. Denies fevers, numbness/tingling. No other complaints.    NAD    Vital Signs Last 24 Hrs  T(C): 36.3 (15 Jul 2025 23:35), Max: 36.6 (15 Jul 2025 08:22)  T(F): 97.4 (15 Jul 2025 23:35), Max: 97.4 (15 Jul 2025 18:38)  HR: 86 (15 Jul 2025 23:35) (81 - 98)  BP: 115/74 (15 Jul 2025 23:35) (115/74 - 172/86)  BP(mean): --  RR: 18 (15 Jul 2025 23:35) (15 - 19)  SpO2: 99% (15 Jul 2025 23:35) (98% - 100%)    Parameters below as of 15 Jul 2025 23:35  Patient On (Oxygen Delivery Method): room air        Physical Exam:    Neck:   Dressing c/d/i  NVID        A/P: 49yFemale POD 1 s/p C5-C6 ACDF on 7/15/2025, doing well.    - Activity: WBAT   - postop abx   - DVT PPx  - Pain control  - IS encouraged  - 11 am  labs  - PT/Rehab  - D/C planning

## 2025-07-16 NOTE — DISCHARGE NOTE PROVIDER - NSDCCPTREATMENT_GEN_ALL_CORE_FT
Left VM 3/27 at 8:53 AM for appointment missed at 8:35am. Please call the clinic at [phone] to confirm next appointment on 4/10 at 10:35am.   
PRINCIPAL PROCEDURE  Procedure: Anterior cervical discectomy and fusion (ACDF)  Findings and Treatment:

## 2025-07-22 ENCOUNTER — NON-APPOINTMENT (OUTPATIENT)
Age: 50
End: 2025-07-22

## 2025-07-22 DIAGNOSIS — J45.909 UNSPECIFIED ASTHMA, UNCOMPLICATED: ICD-10-CM

## 2025-07-22 DIAGNOSIS — M50.122 CERVICAL DISC DISORDER AT C5-C6 LEVEL WITH RADICULOPATHY: ICD-10-CM

## 2025-07-30 ENCOUNTER — APPOINTMENT (OUTPATIENT)
Dept: ORTHOPEDIC SURGERY | Facility: CLINIC | Age: 50
End: 2025-07-30
Payer: COMMERCIAL

## 2025-07-30 DIAGNOSIS — M54.12 RADICULOPATHY, CERVICAL REGION: ICD-10-CM

## 2025-07-30 PROCEDURE — 99024 POSTOP FOLLOW-UP VISIT: CPT

## 2025-07-30 PROCEDURE — 72040 X-RAY EXAM NECK SPINE 2-3 VW: CPT

## 2025-07-30 RX ORDER — METHYLPREDNISOLONE 4 MG/1
4 TABLET ORAL
Qty: 1 | Refills: 0 | Status: ACTIVE | COMMUNITY
Start: 2025-07-30 | End: 1900-01-01

## 2025-09-02 ENCOUNTER — NON-APPOINTMENT (OUTPATIENT)
Age: 50
End: 2025-09-02

## 2025-09-18 ENCOUNTER — RESULT CHARGE (OUTPATIENT)
Age: 50
End: 2025-09-18

## 2025-09-18 ENCOUNTER — APPOINTMENT (OUTPATIENT)
Dept: ORTHOPEDIC SURGERY | Facility: CLINIC | Age: 50
End: 2025-09-18
Payer: COMMERCIAL

## 2025-09-18 DIAGNOSIS — M54.12 RADICULOPATHY, CERVICAL REGION: ICD-10-CM

## 2025-09-18 PROCEDURE — 99024 POSTOP FOLLOW-UP VISIT: CPT

## 2025-09-18 PROCEDURE — 72040 X-RAY EXAM NECK SPINE 2-3 VW: CPT
